# Patient Record
Sex: FEMALE | Race: BLACK OR AFRICAN AMERICAN | Employment: OTHER | URBAN - METROPOLITAN AREA
[De-identification: names, ages, dates, MRNs, and addresses within clinical notes are randomized per-mention and may not be internally consistent; named-entity substitution may affect disease eponyms.]

---

## 2018-11-16 ENCOUNTER — OFFICE VISIT (OUTPATIENT)
Dept: CARDIOLOGY CLINIC | Age: 73
End: 2018-11-16

## 2018-11-16 VITALS
TEMPERATURE: 98.3 F | RESPIRATION RATE: 24 BRPM | SYSTOLIC BLOOD PRESSURE: 166 MMHG | HEART RATE: 89 BPM | HEIGHT: 61 IN | OXYGEN SATURATION: 98 % | BODY MASS INDEX: 38.23 KG/M2 | DIASTOLIC BLOOD PRESSURE: 78 MMHG | WEIGHT: 202.5 LBS

## 2018-11-16 DIAGNOSIS — I34.0 MITRAL VALVE INSUFFICIENCY, UNSPECIFIED ETIOLOGY: Primary | ICD-10-CM

## 2018-11-16 DIAGNOSIS — I25.118 CORONARY ARTERY DISEASE OF NATIVE HEART WITH STABLE ANGINA PECTORIS, UNSPECIFIED VESSEL OR LESION TYPE (HCC): ICD-10-CM

## 2018-11-16 DIAGNOSIS — I48.0 PAROXYSMAL ATRIAL FIBRILLATION (HCC): ICD-10-CM

## 2018-11-16 DIAGNOSIS — Z95.5 S/P DRUG ELUTING CORONARY STENT PLACEMENT: ICD-10-CM

## 2018-11-16 DIAGNOSIS — I10 ESSENTIAL HYPERTENSION: ICD-10-CM

## 2018-11-16 DIAGNOSIS — J45.909 UNCOMPLICATED ASTHMA, UNSPECIFIED ASTHMA SEVERITY, UNSPECIFIED WHETHER PERSISTENT: ICD-10-CM

## 2018-11-16 PROBLEM — I25.10 CORONARY ARTERY DISEASE INVOLVING NATIVE CORONARY ARTERY OF NATIVE HEART: Status: ACTIVE | Noted: 2018-11-16

## 2018-11-16 PROBLEM — E03.9 ACQUIRED HYPOTHYROIDISM: Status: ACTIVE | Noted: 2018-11-16

## 2018-11-16 PROBLEM — E66.01 SEVERE OBESITY (HCC): Status: ACTIVE | Noted: 2018-11-16

## 2018-11-16 RX ORDER — METOPROLOL SUCCINATE 25 MG/1
25 TABLET, EXTENDED RELEASE ORAL DAILY
COMMUNITY

## 2018-11-16 RX ORDER — LEVETIRACETAM 500 MG/1
500 TABLET ORAL 2 TIMES DAILY
COMMUNITY

## 2018-11-16 RX ORDER — CETIRIZINE HCL 10 MG
10 TABLET ORAL DAILY
COMMUNITY
End: 2020-01-14

## 2018-11-16 RX ORDER — ATORVASTATIN CALCIUM 40 MG/1
40 TABLET, FILM COATED ORAL DAILY
COMMUNITY

## 2018-11-16 RX ORDER — VITAMIN E 268 MG
400 CAPSULE ORAL DAILY
COMMUNITY

## 2018-11-16 RX ORDER — AMIODARONE HYDROCHLORIDE 200 MG/1
200 TABLET ORAL DAILY
COMMUNITY
End: 2020-01-14

## 2018-11-16 RX ORDER — DIPHENHYDRAMINE HCL 25 MG
25 TABLET ORAL
COMMUNITY

## 2018-11-16 RX ORDER — MELATONIN
1000 DAILY
COMMUNITY

## 2018-11-16 RX ORDER — CLOPIDOGREL BISULFATE 75 MG/1
75 TABLET ORAL DAILY
COMMUNITY
End: 2020-01-14

## 2018-11-16 RX ORDER — CODEINE PHOSPHATE AND GUAIFENESIN 10; 100 MG/5ML; MG/5ML
5 SOLUTION ORAL
COMMUNITY

## 2018-11-16 RX ORDER — ALBUTEROL SULFATE 2.5 MG/.5ML
SOLUTION RESPIRATORY (INHALATION)
COMMUNITY

## 2018-11-16 RX ORDER — OXYBUTYNIN CHLORIDE 5 MG/1
5 TABLET ORAL 2 TIMES DAILY
COMMUNITY

## 2018-11-16 RX ORDER — ALBUTEROL SULFATE 90 UG/1
2 AEROSOL, METERED RESPIRATORY (INHALATION)
COMMUNITY

## 2018-11-16 NOTE — PROGRESS NOTES
I had the pleasure of seeing Ms. Sami Stover in the valve clinic earlier today with Ms. González Jackman NP - please see her note for details. She has 3+ MR due to annular dilation from AF, and fairly symptomatic with SANDERS. She is higher risk for open MVr due to poor rehab potential (obese and poor motivation), so will have her see surgeons for risk stratification.

## 2018-11-16 NOTE — PROGRESS NOTES
Patient: Clifton Ponce   Age: 74 y.o. Patient Care Team:  Paul Rodríguez MD as PCP - General (Internal Medicine)  Destiny Mesa MD (Cardiology)  Tanya Paul MD (Cardiothoracic Surgery)    PCP: Paul Rodríguez MD    Cardiologist: Helena Dhaliwal    Diagnosis/Reason for Consultation: The primary encounter diagnosis was Mitral valve insufficiency, unspecified etiology. Diagnoses of Paroxysmal atrial fibrillation (HCC), Essential hypertension, Coronary artery disease of native heart with stable angina pectoris, unspecified vessel or lesion type ([facility] [address] S/P drug eluting coronary stent placement, and Uncomplicated asthma, unspecified asthma severity, unspecified whether persistent were also pertinent to this visit. Problem List:   Patient Active Problem List   Diagnosis Code    Non-rheumatic mitral regurgitation I34.0    Acquired hypothyroidism E03.9    Paroxysmal atrial fibrillation (HCC) I48.0    Essential hypertension I10    Coronary artery disease involving native coronary artery of native heart I25.10    Severe obesity ([facility] [address] E66.01      HPI: 74 y.o.  female with PMHx of MR, Afib (3/2016), CAD s/p STEMI & MARTHA to Diag (9/2018), HTN, Asthma, Hypothyroid, Obesity, OA, Urinary incontinence, recurrent UTIs, Depression, PVD w/ chronic venous stasis ulcers, DVT, colon polyps, s/p terence, s/p SON/BSO, s/p  L TKR (2006), s/p R cataract removal (2007), past smoker (quit 1985) that is referred to the [address] [place] [address] by Dr. Helena Dhaliwal  for interventional evaluation of MR. Ms. Ann Knight has been followed by Dr. Kermit Spatz for a bit over 3 yrs when her PCP initially referred her for Afib. She was recently hospitalized for Afib w/ RVR and CHF. During that hospitalization there is a report of rhythm deterioration to VT and Vfib requiring defibrillation. She had subsequent PAF while hospitalized but [facility] chemical cardioversion w/ amiodarone.      Ms. Ann Knight has been home for approximately 4 weeks and reports having good and bad days. Her mobility is limited by considerable back pain as well as SOB/SANDERS. She does not get symptomatic w/ ADLs but has many adaptive devices (shower chair) and habits (sits often between/for tasks). She reports considerable fatigue but also states she is a 'poor sleeper.'  She has some chest tightness on occasion w/ activity that requires her to lie down for resolution. She also has two pillow orthopnea, LE edema and occasional fluid blisters (though none currently) and wears compression socks. She also c/o some lightheadedness when walking any extended distance in her home and doesn't use her rollator when inside. She denies any syncope or falls. Ms. Kalyn Cheney has an inhaler that she uses for 'emergencies' and has used it once since discharge. She lives alone and drove herself to the office today. She sets up her pill box on her own and is independent in all of her medical decision making and ADLs. She has a daughter that lives a couple blocks away that visits nightly. She remarks that she has 'trouble drinking all of the water she is supposed to.' She reports being instructed to drink at least 64 oz of water/day d/t a concern about her kidneys. This is unclear in her records and she denies any other providers / specialists. She denies any blood/blackness/tarriness in her stools. She denies any hx of chest surgery/trauma/XRT. NYHA Classification: II   Class II (Mild): Slight limitation of physical activity. Comfortable at rest, but ordinary physical activity results in fatigue, palpitation, or dyspnea.     Angina Classification: 3   Class 3: Angina with mild exertion   Walking 1-2 level blocks at normal pace; Climbing 1 flight of stairs at normal pace    Past Medical History:   Diagnosis Date    Arthritis     Asthma     Endocrine disease     thyroid    Hypertension     Psychiatric disorder     depression     Endocarditis: no  Pulmonary HTN: yes  Greater than 2/3 systemic: no  Immunocompromised/Steroids: no  Heart Failure Admission w/in past year:  yes  Heart Failure Admission w/in past 2 weeks: no  Liver Dz/Cirrhosis: no   If yes, MELD score: N/A  Last Dental Visit:  15+ yrs ago   Any dental pain/concerns: no - full dentures    Past Surgical History:   Procedure Laterality Date    HX CHOLECYSTECTOMY      HX ORTHOPAEDIC      knee      Social History     Tobacco Use    Smoking status: Never Smoker   Substance Use Topics    Alcohol use: Yes      No family history on file. Prior to Admission medications    Medication Sig Start Date End Date Taking? Authorizing Provider   albuterol (PROVENTIL HFA, VENTOLIN HFA, PROAIR HFA) 90 mcg/actuation inhaler Take 2 Puffs by inhalation every four (4) hours as needed for Wheezing. Yes Provider, Historical   albuterol sulfate (PROVENTIL;VENTOLIN) 2.5 mg/0.5 mL nebu nebulizer solution by Nebulization route every four (4) hours as needed for Wheezing. Yes Provider, Historical   amiodarone (CORDARONE) 200 mg tablet Take 200 mg by mouth daily. Yes Provider, Historical   apixaban (ELIQUIS) 5 mg tablet Take 5 mg by mouth two (2) times a day. Yes Provider, Historical   atorvastatin (LIPITOR) 40 mg tablet Take 40 mg by mouth daily. Yes Provider, Historical   cetirizine (ZYRTEC) 10 mg tablet Take 10 mg by mouth daily. Yes Provider, Historical   cholecalciferol (VITAMIN D3) 1,000 unit tablet Take 1,000 Units by mouth daily. Yes Provider, Historical   clopidogrel (PLAVIX) 75 mg tab Take 75 mg by mouth daily. Yes Provider, Historical   guaiFENesin-codeine (ROBITUSSIN AC) 100-10 mg/5 mL solution Take 5 mL by mouth three (3) times daily as needed for Cough. Yes Provider, Historical   cranberry 405 mg cap Take  by mouth daily. Yes Provider, Historical   diphenhydrAMINE (BENADRYL) 25 mg tablet Take 25 mg by mouth every six (6) hours as needed for Sleep.    Yes Provider, Historical levETIRAcetam (KEPPRA) 500 mg tablet Take 500 mg by mouth two (2) times a day. Yes Provider, Historical   metoprolol succinate (TOPROL-XL) 25 mg XL tablet Take 25 mg by mouth daily. Yes Provider, Historical   oxybutynin (DITROPAN) 5 mg tablet Take 5 mg by mouth two (2) times a day. Yes Provider, Historical   vitamin E (AQUA GEMS) 400 unit capsule Take 400 Units by mouth daily. Yes Provider, Historical   levothyroxine (SYNTHROID) 175 mcg tablet Take 137 mcg by mouth Daily (before breakfast). Yes Other, MD Jeremie   multivitamin (ONE A DAY) tablet Take 1 Tab by mouth daily. Yes Other, MD Jeremie   budesonide-formoterol (SYMBICORT) 160-4.5 mcg/actuation HFA inhaler Take 2 Puffs by inhalation two (2) times a day. Yes Other, MD Jeremie       Allergies   Allergen Reactions    Latex Hives    Barium Sulfate Hives    Sulfa Dyne Swelling     Mouth and tongue       Current Medications:   Current Outpatient Medications   Medication Sig Dispense Refill    albuterol (PROVENTIL HFA, VENTOLIN HFA, PROAIR HFA) 90 mcg/actuation inhaler Take 2 Puffs by inhalation every four (4) hours as needed for Wheezing.  albuterol sulfate (PROVENTIL;VENTOLIN) 2.5 mg/0.5 mL nebu nebulizer solution by Nebulization route every four (4) hours as needed for Wheezing.  amiodarone (CORDARONE) 200 mg tablet Take 200 mg by mouth daily.  apixaban (ELIQUIS) 5 mg tablet Take 5 mg by mouth two (2) times a day.  atorvastatin (LIPITOR) 40 mg tablet Take 40 mg by mouth daily.  cetirizine (ZYRTEC) 10 mg tablet Take 10 mg by mouth daily.  cholecalciferol (VITAMIN D3) 1,000 unit tablet Take 1,000 Units by mouth daily.  clopidogrel (PLAVIX) 75 mg tab Take 75 mg by mouth daily.  guaiFENesin-codeine (ROBITUSSIN AC) 100-10 mg/5 mL solution Take 5 mL by mouth three (3) times daily as needed for Cough.  cranberry 405 mg cap Take  by mouth daily.       diphenhydrAMINE (BENADRYL) 25 mg tablet Take 25 mg by mouth every six (6) hours as needed for Sleep.  levETIRAcetam (KEPPRA) 500 mg tablet Take 500 mg by mouth two (2) times a day.  metoprolol succinate (TOPROL-XL) 25 mg XL tablet Take 25 mg by mouth daily.  oxybutynin (DITROPAN) 5 mg tablet Take 5 mg by mouth two (2) times a day.  vitamin E (AQUA GEMS) 400 unit capsule Take 400 Units by mouth daily.  levothyroxine (SYNTHROID) 175 mcg tablet Take 137 mcg by mouth Daily (before breakfast).  multivitamin (ONE A DAY) tablet Take 1 Tab by mouth daily.  budesonide-formoterol (SYMBICORT) 160-4.5 mcg/actuation HFA inhaler Take 2 Puffs by inhalation two (2) times a day. Vitals: Blood pressure 166/78, pulse 89, temperature 98.3 °F (36.8 °C), temperature source Oral, resp. rate 24, height 5' 1\" (1.549 m), weight 202 lb 8 oz (91.9 kg), SpO2 98 %. Pre 6 min walk VS: HR 52, RR 16, 148/78, 98% sat on RA    Allergies: is allergic to latex; barium sulfate; and sulfa dyne.     Review of Systems: Pertinent Positives per HPI   [x]Total of 13 systems reviewed as follows:  Constitutional: Negative fever, negative chills  Eyes:   Negative for amauroses fugax  ENT:   Negative sore throat,oral absecess  Endocrine Negative for thyroid replacement Rx; goiter; DM  Respiratory:  Negative chronic cough,sputum production  Cards:   Negative for palpitations, lower extremity edema, varicosities, claudication  GI:   Negative for dysphagia, bleeding, nausea, vomiting, diarrhea, and abdominal pain  Genitourinary: Negative for frequency, dysuria  Integument:  Negative for rash and pruritus  Hematologic:  Negative for easy bruising; bleeding dyscarsia  Musculoskel: Negative for muscle weakness inhibiting ambulation  Neurological:  Negative for stroke, TIA, syncope, dizziness  Behavl/Psych: Negative for feelings of anxiety, depression     Cardiovascular Testing:   TTE 9/28/2018:  50-55% LVEF, LA markedly dilated, RV wnl, RA mildly enlarged, AV wnl, mod-severe MR with mild MAC and mild thickening of the anterior and posterior leaflets, mild TR w/ estimated PAS 45 mmHg    VIGNESH 10/4/2018: LVEF wnl but question of some basal inferior wall HK, mildly thickened MV leaflets, no evidence of prolapse or flail, posteior leaflet did appear to be somewhat restricted and diminutive, mod-severe central regurgitation, ERO 0.3 = 0.4cm2 PICA w/ regurgitant volume 63-90 ml; LA severely dilated    Cardiac catheterization 9/6/2018: MARTHA to proximal diag; attempted PCI of  of RCA. L main large caliber free of significant obstructive dz; LAD w/ proximal 30% lesion, mid vessel quite tortuous w/in apical segment, 90% proximal lesion, L circ free of dz, RCA w/ high grade ostial lesion. LVEDP 15-17 mmHg     Physical Exam:  General: Well nourished well groomed elderly woman appearing stated age accompanied by her daughter  Neuro: A&OX3. GRAYSON. PERRL. Steady assisted gait with rollator  Head:Normocephalic. Atraumatic. Symmetrical  Neck: Trachea Midline  Resp: CTA B. No Adv BS/cough/sputum/tachypnea with seated conversation  CV: S1S2 RRR. MONTSE III/VI. No JVD/carotid bruits. Pink/warm/dry extremities. Mod LE peripheral edema  GI:Benign ab. Soft. NT/ND. Active BS  : Voids  Integ: No obvious s/s of infection or breakdown  Musculo/Skeletal: FROM in all major joints. Modest muscle tone    Clinic Evaluation:   KCCQ-12: scanned into EMR    6 minute walk test: PreTest HR/02 sat:  See VS this visit - completed 5 min 2 seconds.  Stopped d/t fatigue             Post Test HR/02 sat: 89 / 98% on RA             Distance Walked: 613 ft 9 inchest    Frality Survey:  Marilin Alma Rosa Index ADL -6/6  scanned into EMR     STS 2.81 Risk Score / Predicted 30 day mortality: - calculations scanned into EMR   STS Adult Cardiac Surgery Database Version 2.9     RISK SCORES Procedure: MV Repair    Risk of Mortality: 1.345%     Renal Failure: 1.971%     Permanent Stroke: 1.183%     Prolonged Ventilation: 7.243%     DSW Infection: 0.112% Reoperation: 2.725%     Morbidity or Mortality: 10.933%     Short Length of Stay: 22.304%     Long Length of Stay: 4.958%    Procedure: Isolated MVR    Risk of Mortality: 4.235%     Renal Failure: 3.181%     Permanent Stroke: 1.474%     Prolonged Ventilation: 12.972%     DSW Infection: 0.215%     Reoperation: 5.446%     Morbidity or Mortality: 17.697%     Short Length of Stay: 15.558%     Long Length of Stay: 9.326%    Assessment/Plan:   1. MR - severe and symptomatic. High risk surgical candidate d/t frailty and mobility. Risks/benefits of TMVr discussed w/ pt and her daughter and they wish to proceed. 2. Afib (3/2016) - amio/BB/apixaban per cards  3. CAD s/p STEMI & MARTHA to Diag (9/2018) - clopidogrel/statin/BB  4. HTN - BB per cards  5. Asthma - Symbicort/Albuterol MDI and albuterol nebs per PCP  6. Hypothyroid - synthroid per PCP  7. Obesity- dietary counseling given. 8. OA - ? Keppra for neuropathic pain? Uses rollator  9. Urinary incontinence, recurrent UTIs - oxybutynin per PCP  10. Depression - no current Rx  11. Further plan/care by Ayse Archibald

## 2018-11-16 NOTE — PROGRESS NOTES
Pt seen and examined  Full note by Melinda Thomas NP reviewed   Echo reviewed  Discussed with DR Agnes Hoyos in valve clinic  She is moderately high surgical risk and has severe MR  Anular dilatation pronounced   Recommended mitral clip as initial attempt at treatment

## 2018-11-19 DIAGNOSIS — I34.0 NON-RHEUMATIC MITRAL REGURGITATION: Primary | ICD-10-CM

## 2018-11-21 ENCOUNTER — HOSPITAL ENCOUNTER (OUTPATIENT)
Dept: GENERAL RADIOLOGY | Age: 73
Discharge: HOME | End: 2018-11-21
Attending: NURSE PRACTITIONER
Payer: MEDICARE

## 2018-11-21 ENCOUNTER — HOSPITAL ENCOUNTER (OUTPATIENT)
Dept: PULMONOLOGY | Age: 73
Discharge: HOME | End: 2018-11-21
Attending: NURSE PRACTITIONER
Payer: MEDICARE

## 2018-11-21 ENCOUNTER — HOSPITAL ENCOUNTER (OUTPATIENT)
Dept: PREADMISSION TESTING | Age: 73
Discharge: HOME | End: 2018-11-21
Attending: NURSE PRACTITIONER
Payer: MEDICARE

## 2018-11-21 VITALS
SYSTOLIC BLOOD PRESSURE: 159 MMHG | WEIGHT: 200.8 LBS | BODY MASS INDEX: 37.91 KG/M2 | HEIGHT: 61 IN | DIASTOLIC BLOOD PRESSURE: 72 MMHG

## 2018-11-21 DIAGNOSIS — I34.0 NON-RHEUMATIC MITRAL REGURGITATION: ICD-10-CM

## 2018-11-21 LAB
ALBUMIN SERPL-MCNC: 3.6 G/DL (ref 3.5–5)
ALBUMIN/GLOB SERPL: 1.1 {RATIO} (ref 1.1–2.2)
ALP SERPL-CCNC: 38 U/L (ref 45–117)
ALT SERPL-CCNC: 13 U/L (ref 12–78)
ANION GAP SERPL CALC-SCNC: 9 MMOL/L (ref 5–15)
APPEARANCE UR: CLEAR
ARTERIAL PATENCY WRIST A: YES
AST SERPL-CCNC: 9 U/L (ref 15–37)
ATRIAL RATE: 61 BPM
BACTERIA URNS QL MICRO: NEGATIVE /HPF
BASE DEFICIT BLD-SCNC: 2 MMOL/L
BASOPHILS # BLD: 0 K/UL (ref 0–0.1)
BASOPHILS NFR BLD: 1 % (ref 0–1)
BDY SITE: NORMAL
BILIRUB SERPL-MCNC: 0.5 MG/DL (ref 0.2–1)
BILIRUB UR QL: NEGATIVE
BNP SERPL-MCNC: 3642 PG/ML (ref 0–450)
BUN SERPL-MCNC: 14 MG/DL (ref 6–20)
BUN/CREAT SERPL: 10 (ref 12–20)
CALCIUM SERPL-MCNC: 8.5 MG/DL (ref 8.5–10.1)
CALCULATED P AXIS, ECG09: 86 DEGREES
CALCULATED R AXIS, ECG10: 47 DEGREES
CALCULATED T AXIS, ECG11: 19 DEGREES
CHLORIDE SERPL-SCNC: 109 MMOL/L (ref 97–108)
CO2 SERPL-SCNC: 22 MMOL/L (ref 21–32)
COLOR UR: ABNORMAL
CREAT SERPL-MCNC: 1.37 MG/DL (ref 0.55–1.02)
DIAGNOSIS, 93000: NORMAL
DIFFERENTIAL METHOD BLD: ABNORMAL
EOSINOPHIL # BLD: 0.1 K/UL (ref 0–0.4)
EOSINOPHIL NFR BLD: 2 % (ref 0–7)
EPITH CASTS URNS QL MICRO: ABNORMAL /LPF
ERYTHROCYTE [DISTWIDTH] IN BLOOD BY AUTOMATED COUNT: 14 % (ref 11.5–14.5)
EST. AVERAGE GLUCOSE BLD GHB EST-MCNC: NORMAL MG/DL
GAS FLOW.O2 O2 DELIVERY SYS: NORMAL L/MIN
GLOBULIN SER CALC-MCNC: 3.2 G/DL (ref 2–4)
GLUCOSE SERPL-MCNC: 96 MG/DL (ref 65–100)
GLUCOSE UR STRIP.AUTO-MCNC: NEGATIVE MG/DL
HBA1C MFR BLD: 4.9 % (ref 4.2–6.3)
HCO3 BLD-SCNC: 22.3 MMOL/L (ref 22–26)
HCT VFR BLD AUTO: 31.2 % (ref 35–47)
HGB BLD-MCNC: 10 G/DL (ref 11.5–16)
HGB UR QL STRIP: NEGATIVE
HYALINE CASTS URNS QL MICRO: ABNORMAL /LPF (ref 0–5)
IMM GRANULOCYTES # BLD: 0 K/UL (ref 0–0.04)
IMM GRANULOCYTES NFR BLD AUTO: 1 % (ref 0–0.5)
KETONES UR QL STRIP.AUTO: NEGATIVE MG/DL
LEUKOCYTE ESTERASE UR QL STRIP.AUTO: ABNORMAL
LYMPHOCYTES # BLD: 0.8 K/UL (ref 0.8–3.5)
LYMPHOCYTES NFR BLD: 25 % (ref 12–49)
MAGNESIUM SERPL-MCNC: 1.7 MG/DL (ref 1.6–2.4)
MCH RBC QN AUTO: 30.4 PG (ref 26–34)
MCHC RBC AUTO-ENTMCNC: 32.1 G/DL (ref 30–36.5)
MCV RBC AUTO: 94.8 FL (ref 80–99)
MONOCYTES # BLD: 0.3 K/UL (ref 0–1)
MONOCYTES NFR BLD: 10 % (ref 5–13)
NEUTS SEG # BLD: 1.9 K/UL (ref 1.8–8)
NEUTS SEG NFR BLD: 61 % (ref 32–75)
NITRITE UR QL STRIP.AUTO: NEGATIVE
NRBC # BLD: 0 K/UL (ref 0–0.01)
NRBC BLD-RTO: 0 PER 100 WBC
O2/TOTAL GAS SETTING VFR VENT: 21 %
P-R INTERVAL, ECG05: 146 MS
PCO2 BLD: 35 MMHG (ref 35–45)
PH BLD: 7.41 [PH] (ref 7.35–7.45)
PH UR STRIP: 5.5 [PH] (ref 5–8)
PLATELET # BLD AUTO: 173 K/UL (ref 150–400)
PMV BLD AUTO: 10.1 FL (ref 8.9–12.9)
PO2 BLD: 88 MMHG (ref 80–100)
POTASSIUM SERPL-SCNC: 4 MMOL/L (ref 3.5–5.1)
PROT SERPL-MCNC: 6.8 G/DL (ref 6.4–8.2)
PROT UR STRIP-MCNC: NEGATIVE MG/DL
Q-T INTERVAL, ECG07: 464 MS
QRS DURATION, ECG06: 94 MS
QTC CALCULATION (BEZET), ECG08: 467 MS
RBC # BLD AUTO: 3.29 M/UL (ref 3.8–5.2)
RBC #/AREA URNS HPF: ABNORMAL /HPF (ref 0–5)
RBC MORPH BLD: ABNORMAL
RBC MORPH BLD: ABNORMAL
SAO2 % BLD: 97 % (ref 92–97)
SODIUM SERPL-SCNC: 140 MMOL/L (ref 136–145)
SP GR UR REFRACTOMETRY: 1.01 (ref 1–1.03)
SPECIMEN TYPE: NORMAL
TSH SERPL DL<=0.05 MIU/L-ACNC: 0.06 UIU/ML (ref 0.36–3.74)
UA: UC IF INDICATED,UAUC: ABNORMAL
UROBILINOGEN UR QL STRIP.AUTO: 0.2 EU/DL (ref 0.2–1)
VENTRICULAR RATE, ECG03: 61 BPM
WBC # BLD AUTO: 3.1 K/UL (ref 3.6–11)
WBC URNS QL MICRO: ABNORMAL /HPF (ref 0–4)

## 2018-11-21 PROCEDURE — 80053 COMPREHEN METABOLIC PANEL: CPT

## 2018-11-21 PROCEDURE — 93880 EXTRACRANIAL BILAT STUDY: CPT

## 2018-11-21 PROCEDURE — 85025 COMPLETE CBC W/AUTO DIFF WBC: CPT

## 2018-11-21 PROCEDURE — 93005 ELECTROCARDIOGRAM TRACING: CPT

## 2018-11-21 PROCEDURE — 36415 COLL VENOUS BLD VENIPUNCTURE: CPT

## 2018-11-21 PROCEDURE — 71046 X-RAY EXAM CHEST 2 VIEWS: CPT

## 2018-11-21 PROCEDURE — 83735 ASSAY OF MAGNESIUM: CPT

## 2018-11-21 PROCEDURE — 82803 BLOOD GASES ANY COMBINATION: CPT

## 2018-11-21 PROCEDURE — 83880 ASSAY OF NATRIURETIC PEPTIDE: CPT

## 2018-11-21 PROCEDURE — 94010 BREATHING CAPACITY TEST: CPT

## 2018-11-21 PROCEDURE — 36600 WITHDRAWAL OF ARTERIAL BLOOD: CPT

## 2018-11-21 PROCEDURE — 86923 COMPATIBILITY TEST ELECTRIC: CPT

## 2018-11-21 PROCEDURE — 81001 URINALYSIS AUTO W/SCOPE: CPT

## 2018-11-21 PROCEDURE — 84443 ASSAY THYROID STIM HORMONE: CPT

## 2018-11-21 PROCEDURE — 83036 HEMOGLOBIN GLYCOSYLATED A1C: CPT

## 2018-11-21 PROCEDURE — 86901 BLOOD TYPING SEROLOGIC RH(D): CPT

## 2018-11-21 NOTE — PROCEDURES
[facility]  *** FINAL REPORT ***    Name: Anthony Castro  MRN: [id number]    Outpatient  : 09 Mar 1945  HIS Order #: [id number]  [id number] [facility] Visit #: [id number]  Date: 2018    TYPE OF TEST: Cerebrovascular Duplex    REASON FOR TEST  pre-op for cardiac surgery    Right Carotid:-             Proximal               Mid                 Distal  cm/s  Systolic  Diastolic  Systolic  Diastolic  Systolic  Diastolic  CCA:     62.8      15.0                            52.0      10.0  Bulb:  ECA:     65.0  ICA:     46.0       9.0       62.0      14.0       62.0      16.0  ICA/CCA:  0.9       0.9    ICA Stenosis:    Right Vertebral:-  Finding: Antegrade  Sys:       46.0  Mya:    Right Subclavian:    Left Carotid:-            Proximal                Mid                 Distal  cm/s  Systolic  Diastolic  Systolic  Diastolic  Systolic  Diastolic  CCA:     82.6      15.0                            61.0      15.0  Bulb:  ECA:     63.0  ICA:     57.0      17.0       62.0      21.0       64.0      10.0  ICA/CCA:  0.9       1.1    ICA Stenosis:    Left Vertebral:-  Finding: Antegrade  Sys:       43.0  Mya:    Left Subclavian:    INTERPRETATION/FINDINGS  PROCEDURE:  Color duplex ultrasound imaging of extracranial  cerebrovascular arteries. FINDINGS:       Right:  Internal carotid velocity is within normal limits. There   is narrowing of the internal carotid flow channel on color Doppler  imaging and mixed density plaque on B-mode imaging, consistent with  less than 50 percent stenosis. The common and external carotid  arteries are patent and without evidence of hemodynamically  significant stenosis. Left:   Internal carotid velocity is within normal limits. There   is narrowing of the internal carotid flow channel on color Doppler  imaging and mixed density plaque on B-mode imaging, consistent with  less than 50 percent stenosis.   The common and external carotid  arteries are patent and without evidence of hemodynamically  significant stenosis. IMPRESSION:  Consistent with less than 50% stenosis of the internal  carotids. Vertebrals are patent with antegrade flow. ADDITIONAL COMMENTS    I have personally reviewed the data relevant to the interpretation of  this  study.     TECHNOLOGIST: Paola Greene RVT  Signed: 11/21/2018 11:31 AM    PHYSICIAN: Prince Lorenzo MD  Signed: 11/26/2018 06:44 AM

## 2018-11-21 NOTE — H&P
CSS 
 History and Physical 
 
Subjective:  
  
**information obtained from previous office note -no changes 74 y.o.  female with PMHx of MR, Afib (3/2016), CAD s/p STEMI & MARTHA to Diag (9/2018), HTN, Asthma, Hypothyroid, Obesity, OA, Urinary incontinence, recurrent UTIs, Depression, PVD w/ chronic venous stasis ulcers, DVT, colon polyps, s/p terence, s/p SON/BSO, s/p  L TKR (2006), s/p R cataract removal (2007), past smoker (quit 1985) that is referred to the [address] [place] [address] by Dr. Anthony Jewell  for interventional evaluation of MR. 
  
Ms. Kavitha Mcdowell has been followed by Dr. Yocasta Santiago for a bit over 3 yrs when her PCP initially referred her for Afib. She was recently hospitalized for Afib w/ RVR and CHF. During that hospitalization there is a report of rhythm deterioration to VT and Vfib requiring defibrillation. She had subsequent PAF while hospitalized but was ultimately discharged in [facility] after chemical cardioversion w/ amiodarone.  
  
Ms. Kavitha Mcdowell has been home for approximately 4 weeks and reports having good and bad days. Her mobility is limited by considerable back pain as well as SOB/SANDERS. She does not get symptomatic w/ ADLs but has many adaptive devices (shower chair) and habits (sits often between/for tasks). She reports considerable fatigue but also states she is a 'poor sleeper.'  She has some chest tightness on occasion w/ activity that requires her to lie down for resolution. She also has two pillow orthopnea, LE edema and occasional fluid blisters (though none currently) and wears compression socks. She also c/o some lightheadedness when walking any extended distance in her home and doesn't use her rollator when inside. She denies any syncope or falls. Ms. Kavitha Mcdowell has an inhaler that she uses for 'emergencies' and has used it once since discharge.  
  
She lives alone and drove herself to the office today.   She sets up her pill box on her own and is independent in all of her medical decision making and ADLs. She has a daughter that lives a couple blocks away that visits nightly. She remarks that she has 'trouble drinking all of the water she is supposed to.' She reports being instructed to drink at least 64 oz of water/day d/t a concern about her kidneys. This is unclear in her records and she denies any other providers / specialists. She denies any blood/blackness/tarriness in her stools. She denies any hx of chest surgery/trauma/XRT.   
  
NYHA Classification: II 
            Class II (Mild): Slight limitation of physical activity. Comfortable at rest, but ordinary physical activity results in fatigue, palpitation, or dyspnea. 
  
Angina Classification: 3 Class 3: Angina with mild exertion Walking 1-2 level blocks at normal pace; Climbing 1 flight of stairs at normal pace Cardiac Testing TTE 9/28/2018:  50-55% LVEF, LA markedly dilated, RV wnl, RA mildly enlarged, AV wnl, mod-severe MR with mild MAC and mild thickening of the anterior and posterior leaflets, mild TR w/ estimated PAS 45 mmHg 
  
VIGNESH 10/4/2018: LVEF wnl but question of some basal inferior wall HK, mildly thickened MV leaflets, no evidence of prolapse or flail, posteior leaflet did appear to be somewhat restricted and diminutive, mod-severe central regurgitation, ERO 0.3 = 0.4cm2 PICA w/ regurgitant volume 63-90 ml; LA severely dilated 
  
Cardiac catheterization 9/6/2018: MARTHA to proximal diag; attempted PCI of  of RCA. L main large caliber free of significant obstructive dz; LAD w/ proximal 30% lesion, mid vessel quite tortuous w/in apical segment, 90% proximal lesion, L circ free of dz, RCA w/ high grade ostial lesion. LVEDP 15-17 mmHg Past Medical History:  
Diagnosis Date  Arthritis  Asthma  Endocrine disease   
 thyroid  Hypertension  Psychiatric disorder   
 depression Past Surgical History: Procedure Laterality Date  HX CHOLECYSTECTOMY  HX ORTHOPAEDIC    
 knee Social History Tobacco Use  Smoking status: Never Smoker Substance Use Topics  Alcohol use: Yes No family history on file. Prior to Admission medications Medication Sig Start Date End Date Taking? Authorizing Provider  
albuterol (PROVENTIL HFA, VENTOLIN HFA, PROAIR HFA) 90 mcg/actuation inhaler Take 2 Puffs by inhalation every four (4) hours as needed for Wheezing. Provider, Historical  
albuterol sulfate (PROVENTIL;VENTOLIN) 2.5 mg/0.5 mL nebu nebulizer solution by Nebulization route every four (4) hours as needed for Wheezing. Provider, Historical  
amiodarone (CORDARONE) 200 mg tablet Take 200 mg by mouth daily. Provider, Historical  
apixaban (ELIQUIS) 5 mg tablet Take 5 mg by mouth two (2) times a day. Provider, Historical  
atorvastatin (LIPITOR) 40 mg tablet Take 40 mg by mouth daily. Provider, Historical  
cetirizine (ZYRTEC) 10 mg tablet Take 10 mg by mouth daily. Provider, Historical  
cholecalciferol (VITAMIN D3) 1,000 unit tablet Take 1,000 Units by mouth daily. Provider, Historical  
clopidogrel (PLAVIX) 75 mg tab Take 75 mg by mouth daily. Provider, Historical  
guaiFENesin-codeine (ROBITUSSIN AC) 100-10 mg/5 mL solution Take 5 mL by mouth three (3) times daily as needed for Cough. Provider, Historical  
cranberry 405 mg cap Take  by mouth daily. Provider, Historical  
diphenhydrAMINE (BENADRYL) 25 mg tablet Take 25 mg by mouth every six (6) hours as needed for Sleep. Provider, Historical  
levETIRAcetam (KEPPRA) 500 mg tablet Take 500 mg by mouth two (2) times a day. Provider, Historical  
metoprolol succinate (TOPROL-XL) 25 mg XL tablet Take 25 mg by mouth daily. Provider, Historical  
oxybutynin (DITROPAN) 5 mg tablet Take 5 mg by mouth two (2) times a day. Provider, Historical  
vitamin E (AQUA GEMS) 400 unit capsule Take 400 Units by mouth daily. Provider, Son  
levothyroxine (SYNTHROID) 175 mcg tablet Take 137 mcg by mouth Daily (before breakfast). Jeremie Jha MD  
multivitamin (ONE A DAY) tablet Take 1 Tab by mouth daily. Jeremie Jha MD  
budesonide-formoterol (SYMBICORT) 160-4.5 mcg/actuation HFA inhaler Take 2 Puffs by inhalation two (2) times a day. Jeremie Jha MD  
   
Allergies Allergen Reactions  Latex Hives  Barium Sulfate Hives  Sulfa Dyne Swelling Mouth and tongue Review of Systems: pertinent positives in HPI Consititutional: Denies fever or chills. Eyes:  Denies use of glasses or vision problems(cataracts). ENT:  Denies hearing or swallowing difficulty. CV: Denies CP, claudication, HTN. Resp: Denies dyspnea, productive cough. : Denies dialysis or kidney problems. GI: Denies ulcers, esophageal strictures, liver problems. M/S: Denies joint or bone problems, or implanted artificial hardware. Skin: Denies varicose veins, edema. Neuro: Denies strokes, or TIAs. Psych: Denies anxiety or depression. Endocrine: Denies thyroid problems or diabetes. Heme/Lymphatic: Denies easy bruising or lymphedema. Objective:  
 
VS: T99.2 sats 98% HR 69 /68 Physical Exam:   
General appearance: alert, cooperative, no distress Head: normocephalic, without obvious abnormality; atraumatic Eyes: conjunctivae/corneas clear; EOM's intact. Nose: nares normal; no drainage. Neck: no carotid bruit and no JVD Lungs: clear to auscultation bilaterally Heart: regular rate and rhythm; +3/6 murmur Abdomen: soft, non-tender; bowel sounds normal 
Extremities: moves all extremities; no weakness. Skin: Skin color normal; No varicose veins, 2+ LE edema. Neurologic: Grossly normal   
 
Labs:  
Recent Labs  
  11/21/18 
1214 WBC 3.1* HGB 10.0* HCT 31.2*  
   
K 4.0  
BUN 14  
CREA 1.37* GLU 96 Diagnostics:  
PA and lateral: IMPRESSION: No acute process identified Carotid doppler: IMPRESSION:  Consistent with less than 50% stenosis of the internal 
carotids. Vertebrals are patent with antegrade flow. PFTS-FEV1: 1.27L <79% predicted EKG: Normal sinus rhythm Nonspecific ST abnormality Abnormal ECG No previous ECGs available Assessment:  
 
Active Problems: 
  Non-rheumatic mitral regurgitation (11/16/2018) Plan: The risk and benefit of surgery were reviewed with patient and family and all questions answered and the patient wishes to proceed. Risk include infection, bleeding, stroke, heart attack, irregular heart rhythm, kidney failure and death. The patient was given instructions. The patient was instructed to take last dose of eliquis 12/3/18. Surgery is scheduled for 12/5/18. STS Risk Calculator V2.81 - Discussed by surgeon with patient. STS Adult Cardiac Surgery Database Version 2.9 RISK SCORES Procedure: MV Repair Risk of Mortality: 1.345% Renal Failure: 1.971% Permanent Stroke: 1.183% Prolonged Ventilation: 7.243% DSW Infection: 0.112% Reoperation: 2.725% Morbidity or Mortality: 10.933% Short Length of Stay: 22.304% Long Length of Stay: 4.958% Procedure: Isolated MVR Risk of Mortality: 4.235% Renal Failure: 3.181% Permanent Stroke: 1.474% Prolonged Ventilation: 12.972% DSW Infection: 0.215% Reoperation: 5.446% Morbidity or Mortality: 17.697% Short Length of Stay: 15.558% Long Length of Stay: 9.326% 
  
Assessment/Plan: 1. MR - severe and symptomatic. High risk surgical candidate d/t frailty and mobility. Risks/benefits of TMVr discussed w/ pt and her daughter and they wish to proceed. 2. Afib (3/2016) - amio/BB/apixaban per cards. Hold eliquis 24 hrs preop. 3. CAD s/p STEMI & MARTHA to Diag (9/2018) - clopidogrel/statin/BB 4. HTN - BB per cards 5. Asthma - Symbicort/Albuterol MDI and albuterol nebs per PCP 6. Hypothyroid - synthroid per PCP 7. Obesity- dietary counseling given. 8. OA - ? Keppra for neuropathic pain? Uses rollator 9. Urinary incontinence, recurrent UTIs-  oxybutynin per PCP. UA shows trace leuks, follow culture. 10. Depression - no current Rx 11. Renal insufficiency: Cr elevated on preop labs, similar to labs drawn in 2012. Monitor Cr 
12. Further plan/care by Ayse Caballero Signed By: Jacqui Marte NP November 21, 2018

## 2018-11-22 NOTE — PROCEDURES
[address]  PULMONARY FUNCTION    Rasheeda Aleman  MR#: [id number]  : 1945  ACCOUNT #: [de-identified]   DATE OF SERVICE: 2018    REFERRING PHYSICIAN:  Cary Soulier     INDICATION:  Mitral regurg. Pulmonary function testing on 2018 reviewed. The vital capacity is 1.8 liters, 76% predicted. FEV1 is 1.27 liters, 79% predicted. FEV1/FVC ratio is normal at 70%. Flow volume loop does demonstrate attenuation of both inspiratory and expiratory limbs with patient demonstrating poor effort per technician. IMPRESSION:  Limited quality pulmonary function testing suggesting possible mild restrictive physiology without evidence of concomitant obstruction. Clinical correlation advised.       MD MAYRA Craft / ADRIANNE  D: 2018 11:33     T: 2018 12:14  JOB #: [id number]

## 2018-12-04 ENCOUNTER — TELEPHONE (OUTPATIENT)
Dept: CASE MANAGEMENT | Age: 73
End: 2018-12-04

## 2018-12-04 RX ORDER — SODIUM CHLORIDE 9 MG/ML
1.5-3 INJECTION, SOLUTION INTRAVENOUS
Status: DISPENSED | OUTPATIENT
Start: 2018-12-05 | End: 2018-12-05

## 2018-12-04 RX ORDER — SODIUM CHLORIDE 0.9 % (FLUSH) 0.9 %
5-10 SYRINGE (ML) INJECTION AS NEEDED
Status: CANCELLED | OUTPATIENT
Start: 2018-12-04

## 2018-12-04 RX ORDER — MORPHINE SULFATE 10 MG/ML
2 INJECTION, SOLUTION INTRAMUSCULAR; INTRAVENOUS
Status: CANCELLED | OUTPATIENT
Start: 2018-12-04

## 2018-12-04 RX ORDER — FENTANYL CITRATE 50 UG/ML
25 INJECTION, SOLUTION INTRAMUSCULAR; INTRAVENOUS
Status: CANCELLED | OUTPATIENT
Start: 2018-12-04

## 2018-12-04 RX ORDER — ONDANSETRON 2 MG/ML
4 INJECTION INTRAMUSCULAR; INTRAVENOUS AS NEEDED
Status: CANCELLED | OUTPATIENT
Start: 2018-12-04

## 2018-12-04 RX ORDER — SODIUM CHLORIDE, SODIUM LACTATE, POTASSIUM CHLORIDE, CALCIUM CHLORIDE 600; 310; 30; 20 MG/100ML; MG/100ML; MG/100ML; MG/100ML
100 INJECTION, SOLUTION INTRAVENOUS CONTINUOUS
Status: CANCELLED | OUTPATIENT
Start: 2018-12-04

## 2018-12-04 RX ORDER — OXYCODONE HYDROCHLORIDE 5 MG/1
5 TABLET ORAL AS NEEDED
Status: CANCELLED | OUTPATIENT
Start: 2018-12-04

## 2018-12-04 RX ORDER — MIDAZOLAM HYDROCHLORIDE 1 MG/ML
0.5 INJECTION, SOLUTION INTRAMUSCULAR; INTRAVENOUS
Status: CANCELLED | OUTPATIENT
Start: 2018-12-04

## 2018-12-04 NOTE — PERIOP NOTES
PAT PHONE INTERVIEW COMPLETED WITH PT.  PT WAS GIVEN INFECTION PREVENTION INFORMATION VERBALLY; PT VOICED UNDERSTANDING. PT WAS GIVEN THE OPPORTUNITY TO ASK ADDITIONAL QUESTIONS. NO PTA MEDICATION DIRECTIONS GIVEN TO PT BY PAT; HER MEDICATIONS WERE VERIFIED TO BE CORRECT IN CC.  PT WAS GIVEN PRE OP DIRECTIONS BY SURGEON'S OFFICE.

## 2018-12-04 NOTE — TELEPHONE ENCOUNTER
Cardiac Surgery Care Coordinator-  Called Yonny Velásquez to review plan of care and day of surgery expectations. Encouraged Yonny Velásquez to verbalize and offered emotional support. Yonny Velásquez is without questions or concerns at this time.   Carlitos Reed RN

## 2018-12-05 ENCOUNTER — ANESTHESIA EVENT (OUTPATIENT)
Dept: CARDIOTHORACIC SURGERY | Age: 73
DRG: 229 | End: 2018-12-05
Payer: MEDICARE

## 2018-12-05 ENCOUNTER — APPOINTMENT (OUTPATIENT)
Dept: GENERAL RADIOLOGY | Age: 73
DRG: 229 | End: 2018-12-05
Attending: THORACIC SURGERY (CARDIOTHORACIC VASCULAR SURGERY)
Payer: MEDICARE

## 2018-12-05 ENCOUNTER — HOSPITAL ENCOUNTER (INPATIENT)
Age: 73
LOS: 2 days | Discharge: HOME | DRG: 229 | End: 2018-12-07
Attending: THORACIC SURGERY (CARDIOTHORACIC VASCULAR SURGERY) | Admitting: THORACIC SURGERY (CARDIOTHORACIC VASCULAR SURGERY)
Payer: MEDICARE

## 2018-12-05 ENCOUNTER — ANESTHESIA (OUTPATIENT)
Dept: CARDIOTHORACIC SURGERY | Age: 73
DRG: 229 | End: 2018-12-05
Payer: MEDICARE

## 2018-12-05 ENCOUNTER — APPOINTMENT (OUTPATIENT)
Dept: GENERAL RADIOLOGY | Age: 73
DRG: 229 | End: 2018-12-05
Attending: PHYSICIAN ASSISTANT
Payer: MEDICARE

## 2018-12-05 PROBLEM — I34.0 MITRAL REGURGITATION: Status: ACTIVE | Noted: 2018-12-05

## 2018-12-05 LAB
ACT BLD: 241 SECS (ref 79–138)
ACT BLD: 252 SECS (ref 79–138)
ACT BLD: 274 SECS (ref 79–138)
ALBUMIN SERPL-MCNC: 3.1 G/DL (ref 3.5–5)
ALBUMIN/GLOB SERPL: 1.1 {RATIO} (ref 1.1–2.2)
ALP SERPL-CCNC: 37 U/L (ref 45–117)
ALT SERPL-CCNC: 10 U/L (ref 12–78)
ANION GAP SERPL CALC-SCNC: 8 MMOL/L (ref 5–15)
APTT PPP: 25.5 SEC (ref 22.1–32)
APTT PPP: 25.5 SEC (ref 22.1–32)
AST SERPL-CCNC: 10 U/L (ref 15–37)
BASOPHILS # BLD: 0 K/UL (ref 0–0.1)
BASOPHILS NFR BLD: 0 % (ref 0–1)
BILIRUB SERPL-MCNC: 0.4 MG/DL (ref 0.2–1)
BUN SERPL-MCNC: 16 MG/DL (ref 6–20)
BUN/CREAT SERPL: 14 (ref 12–20)
CALCIUM SERPL-MCNC: 7.7 MG/DL (ref 8.5–10.1)
CHLORIDE SERPL-SCNC: 113 MMOL/L (ref 97–108)
CO2 SERPL-SCNC: 23 MMOL/L (ref 21–32)
CREAT SERPL-MCNC: 1.17 MG/DL (ref 0.55–1.02)
DIFFERENTIAL METHOD BLD: ABNORMAL
EOSINOPHIL # BLD: 0.1 K/UL (ref 0–0.4)
EOSINOPHIL NFR BLD: 2 % (ref 0–7)
ERYTHROCYTE [DISTWIDTH] IN BLOOD BY AUTOMATED COUNT: 13.5 % (ref 11.5–14.5)
GLOBULIN SER CALC-MCNC: 2.7 G/DL (ref 2–4)
GLUCOSE SERPL-MCNC: 98 MG/DL (ref 65–100)
HCT VFR BLD AUTO: 28.9 % (ref 35–47)
HGB BLD-MCNC: 9.3 G/DL (ref 11.5–16)
IMM GRANULOCYTES # BLD: 0 K/UL
IMM GRANULOCYTES NFR BLD AUTO: 0 %
INR PPP: 1.1 (ref 0.9–1.1)
INR PPP: 1.7 (ref 0.9–1.1)
LYMPHOCYTES # BLD: 0.9 K/UL (ref 0.8–3.5)
LYMPHOCYTES NFR BLD: 20 % (ref 12–49)
MAGNESIUM SERPL-MCNC: 1.7 MG/DL (ref 1.6–2.4)
MCH RBC QN AUTO: 30.4 PG (ref 26–34)
MCHC RBC AUTO-ENTMCNC: 32.2 G/DL (ref 30–36.5)
MCV RBC AUTO: 94.4 FL (ref 80–99)
METAMYELOCYTES NFR BLD MANUAL: 1 %
MONOCYTES # BLD: 0.1 K/UL (ref 0–1)
MONOCYTES NFR BLD: 2 % (ref 5–13)
NEUTS BAND NFR BLD MANUAL: 5 % (ref 0–6)
NEUTS SEG # BLD: 3.4 K/UL (ref 1.8–8)
NEUTS SEG NFR BLD: 70 % (ref 32–75)
NRBC # BLD: 0 K/UL (ref 0–0.01)
NRBC BLD-RTO: 0 PER 100 WBC
PLATELET # BLD AUTO: 199 K/UL (ref 150–400)
PMV BLD AUTO: 9.6 FL (ref 8.9–12.9)
POTASSIUM SERPL-SCNC: 3.6 MMOL/L (ref 3.5–5.1)
PROT SERPL-MCNC: 5.8 G/DL (ref 6.4–8.2)
PROTHROMBIN TIME: 11.5 SEC (ref 9–11.1)
PROTHROMBIN TIME: 16.7 SEC (ref 9–11.1)
RBC # BLD AUTO: 3.06 M/UL (ref 3.8–5.2)
RBC MORPH BLD: ABNORMAL
RBC MORPH BLD: ABNORMAL
SODIUM SERPL-SCNC: 144 MMOL/L (ref 136–145)
THERAPEUTIC RANGE,PTTT: NORMAL SECS (ref 58–77)
THERAPEUTIC RANGE,PTTT: NORMAL SECS (ref 58–77)
WBC # BLD AUTO: 4.5 K/UL (ref 3.6–11)

## 2018-12-05 PROCEDURE — 74011000250 HC RX REV CODE- 250: Performed by: THORACIC SURGERY (CARDIOTHORACIC VASCULAR SURGERY)

## 2018-12-05 PROCEDURE — 77030002996 HC SUT SLK J&J -A: Performed by: THORACIC SURGERY (CARDIOTHORACIC VASCULAR SURGERY)

## 2018-12-05 PROCEDURE — 74011250637 HC RX REV CODE- 250/637: Performed by: PHYSICIAN ASSISTANT

## 2018-12-05 PROCEDURE — 77030013797 HC KT TRNSDUC PRSSR EDWD -A

## 2018-12-05 PROCEDURE — 76060000035 HC ANESTHESIA 2 TO 2.5 HR: Performed by: THORACIC SURGERY (CARDIOTHORACIC VASCULAR SURGERY)

## 2018-12-05 PROCEDURE — 77030039626: Performed by: THORACIC SURGERY (CARDIOTHORACIC VASCULAR SURGERY)

## 2018-12-05 PROCEDURE — 77030018729 HC ELECTRD DEFIB PAD CARD -B

## 2018-12-05 PROCEDURE — 03HY32Z INSERTION OF MONITORING DEVICE INTO UPPER ARTERY, PERCUTANEOUS APPROACH: ICD-10-PCS | Performed by: ANESTHESIOLOGY

## 2018-12-05 PROCEDURE — 77030013079 HC BLNKT BAIR HGGR 3M -A: Performed by: ANESTHESIOLOGY

## 2018-12-05 PROCEDURE — C1892 INTRO/SHEATH,FIXED,PEEL-AWAY: HCPCS | Performed by: THORACIC SURGERY (CARDIOTHORACIC VASCULAR SURGERY)

## 2018-12-05 PROCEDURE — B24BZZ4 ULTRASONOGRAPHY OF HEART WITH AORTA, TRANSESOPHAGEAL: ICD-10-PCS | Performed by: ANESTHESIOLOGY

## 2018-12-05 PROCEDURE — C1894 INTRO/SHEATH, NON-LASER: HCPCS | Performed by: THORACIC SURGERY (CARDIOTHORACIC VASCULAR SURGERY)

## 2018-12-05 PROCEDURE — 74011000250 HC RX REV CODE- 250

## 2018-12-05 PROCEDURE — 74011250636 HC RX REV CODE- 250/636: Performed by: PHYSICIAN ASSISTANT

## 2018-12-05 PROCEDURE — 77030013744

## 2018-12-05 PROCEDURE — 93325 DOPPLER ECHO COLOR FLOW MAPG: CPT

## 2018-12-05 PROCEDURE — 77030020506 HC NDL TRNSPTL NRG BAYL -F: Performed by: THORACIC SURGERY (CARDIOTHORACIC VASCULAR SURGERY)

## 2018-12-05 PROCEDURE — 85610 PROTHROMBIN TIME: CPT

## 2018-12-05 PROCEDURE — C1751 CATH, INF, PER/CENT/MIDLINE: HCPCS

## 2018-12-05 PROCEDURE — 77030019702 HC WRP THER MENM -C: Performed by: THORACIC SURGERY (CARDIOTHORACIC VASCULAR SURGERY)

## 2018-12-05 PROCEDURE — 51798 US URINE CAPACITY MEASURE: CPT

## 2018-12-05 PROCEDURE — 85730 THROMBOPLASTIN TIME PARTIAL: CPT

## 2018-12-05 PROCEDURE — 77030013798 HC KT TRNSDUC PRSSR EDWD -B: Performed by: THORACIC SURGERY (CARDIOTHORACIC VASCULAR SURGERY)

## 2018-12-05 PROCEDURE — 77030005402 HC CATH RAD ART LN KT TELE -B

## 2018-12-05 PROCEDURE — 74011250636 HC RX REV CODE- 250/636: Performed by: THORACIC SURGERY (CARDIOTHORACIC VASCULAR SURGERY)

## 2018-12-05 PROCEDURE — 74011250636 HC RX REV CODE- 250/636

## 2018-12-05 PROCEDURE — 74011250636 HC RX REV CODE- 250/636: Performed by: NURSE PRACTITIONER

## 2018-12-05 PROCEDURE — 77030008771 HC TU NG SALEM SUMP -A: Performed by: ANESTHESIOLOGY

## 2018-12-05 PROCEDURE — C1893 INTRO/SHEATH, FIXED,NON-PEEL: HCPCS | Performed by: THORACIC SURGERY (CARDIOTHORACIC VASCULAR SURGERY)

## 2018-12-05 PROCEDURE — 65610000003 HC RM ICU SURGICAL

## 2018-12-05 PROCEDURE — 71045 X-RAY EXAM CHEST 1 VIEW: CPT

## 2018-12-05 PROCEDURE — 36556 INSERT NON-TUNNEL CV CATH: CPT

## 2018-12-05 PROCEDURE — 74011000258 HC RX REV CODE- 258: Performed by: PHYSICIAN ASSISTANT

## 2018-12-05 PROCEDURE — 80053 COMPREHEN METABOLIC PANEL: CPT

## 2018-12-05 PROCEDURE — 85347 COAGULATION TIME ACTIVATED: CPT

## 2018-12-05 PROCEDURE — 77030018719 HC DRSG PTCH ANTIMIC J&J -A

## 2018-12-05 PROCEDURE — 77030026438 HC STYL ET INTUB CARD -A: Performed by: ANESTHESIOLOGY

## 2018-12-05 PROCEDURE — 76010000108 HC CV SURG 2 TO 2.5 HR: Performed by: THORACIC SURGERY (CARDIOTHORACIC VASCULAR SURGERY)

## 2018-12-05 PROCEDURE — 74011000258 HC RX REV CODE- 258: Performed by: THORACIC SURGERY (CARDIOTHORACIC VASCULAR SURGERY)

## 2018-12-05 PROCEDURE — 93355 ECHO TRANSESOPHAGEAL (TEE): CPT | Performed by: THORACIC SURGERY (CARDIOTHORACIC VASCULAR SURGERY)

## 2018-12-05 PROCEDURE — 85025 COMPLETE CBC W/AUTO DIFF WBC: CPT

## 2018-12-05 PROCEDURE — 94640 AIRWAY INHALATION TREATMENT: CPT

## 2018-12-05 PROCEDURE — 02HV33Z INSERTION OF INFUSION DEVICE INTO SUPERIOR VENA CAVA, PERCUTANEOUS APPROACH: ICD-10-PCS | Performed by: ANESTHESIOLOGY

## 2018-12-05 PROCEDURE — 77030020061 HC IV BLD WRMR ADMIN SET 3M -B: Performed by: ANESTHESIOLOGY

## 2018-12-05 PROCEDURE — 77030020263 HC SOL INJ SOD CL0.9% LFCR 1000ML: Performed by: THORACIC SURGERY (CARDIOTHORACIC VASCULAR SURGERY)

## 2018-12-05 PROCEDURE — 36415 COLL VENOUS BLD VENIPUNCTURE: CPT

## 2018-12-05 PROCEDURE — C1769 GUIDE WIRE: HCPCS | Performed by: THORACIC SURGERY (CARDIOTHORACIC VASCULAR SURGERY)

## 2018-12-05 PROCEDURE — 74011000250 HC RX REV CODE- 250: Performed by: PHYSICIAN ASSISTANT

## 2018-12-05 PROCEDURE — 02UG3JZ SUPPLEMENT MITRAL VALVE WITH SYNTHETIC SUBSTITUTE, PERCUTANEOUS APPROACH: ICD-10-PCS | Performed by: INTERNAL MEDICINE

## 2018-12-05 PROCEDURE — 83735 ASSAY OF MAGNESIUM: CPT

## 2018-12-05 PROCEDURE — 77030008684 HC TU ET CUF COVD -B: Performed by: ANESTHESIOLOGY

## 2018-12-05 PROCEDURE — C1751 CATH, INF, PER/CENT/MIDLINE: HCPCS | Performed by: THORACIC SURGERY (CARDIOTHORACIC VASCULAR SURGERY)

## 2018-12-05 PROCEDURE — 76001 XR FLUOROSCOPY OVER 60 MINUTES: CPT

## 2018-12-05 PROCEDURE — 36620 INSERTION CATHETER ARTERY: CPT

## 2018-12-05 DEVICE — MITRACLIP NTR CLIP DELIVERY SYSTEM
Type: IMPLANTABLE DEVICE | Site: MITRAL VALVE | Status: FUNCTIONAL
Brand: MITRACLIP

## 2018-12-05 DEVICE — IMPLANTABLE DEVICE: Type: IMPLANTABLE DEVICE | Site: MITRAL VALVE | Status: FUNCTIONAL

## 2018-12-05 RX ORDER — SODIUM CHLORIDE, SODIUM LACTATE, POTASSIUM CHLORIDE, CALCIUM CHLORIDE 600; 310; 30; 20 MG/100ML; MG/100ML; MG/100ML; MG/100ML
25 INJECTION, SOLUTION INTRAVENOUS CONTINUOUS
Status: DISCONTINUED | OUTPATIENT
Start: 2018-12-05 | End: 2018-12-05 | Stop reason: HOSPADM

## 2018-12-05 RX ORDER — SODIUM CHLORIDE 0.9 % (FLUSH) 0.9 %
5-10 SYRINGE (ML) INJECTION EVERY 8 HOURS
Status: DISCONTINUED | OUTPATIENT
Start: 2018-12-05 | End: 2018-12-05 | Stop reason: HOSPADM

## 2018-12-05 RX ORDER — ROPIVACAINE HYDROCHLORIDE 5 MG/ML
30 INJECTION, SOLUTION EPIDURAL; INFILTRATION; PERINEURAL AS NEEDED
Status: DISCONTINUED | OUTPATIENT
Start: 2018-12-05 | End: 2018-12-05 | Stop reason: HOSPADM

## 2018-12-05 RX ORDER — NALOXONE HYDROCHLORIDE 0.4 MG/ML
0.4 INJECTION, SOLUTION INTRAMUSCULAR; INTRAVENOUS; SUBCUTANEOUS AS NEEDED
Status: DISCONTINUED | OUTPATIENT
Start: 2018-12-05 | End: 2018-12-06

## 2018-12-05 RX ORDER — SODIUM CHLORIDE 0.9 % (FLUSH) 0.9 %
5-10 SYRINGE (ML) INJECTION AS NEEDED
Status: DISCONTINUED | OUTPATIENT
Start: 2018-12-05 | End: 2018-12-05 | Stop reason: HOSPADM

## 2018-12-05 RX ORDER — TRAMADOL HYDROCHLORIDE 50 MG/1
50-100 TABLET ORAL
Status: DISCONTINUED | OUTPATIENT
Start: 2018-12-05 | End: 2018-12-07 | Stop reason: HOSPADM

## 2018-12-05 RX ORDER — ONDANSETRON 2 MG/ML
INJECTION INTRAMUSCULAR; INTRAVENOUS AS NEEDED
Status: DISCONTINUED | OUTPATIENT
Start: 2018-12-05 | End: 2018-12-05 | Stop reason: HOSPADM

## 2018-12-05 RX ORDER — GLYCOPYRROLATE 0.2 MG/ML
INJECTION INTRAMUSCULAR; INTRAVENOUS AS NEEDED
Status: DISCONTINUED | OUTPATIENT
Start: 2018-12-05 | End: 2018-12-05 | Stop reason: HOSPADM

## 2018-12-05 RX ORDER — ONDANSETRON 2 MG/ML
4 INJECTION INTRAMUSCULAR; INTRAVENOUS
Status: DISCONTINUED | OUTPATIENT
Start: 2018-12-05 | End: 2018-12-07 | Stop reason: HOSPADM

## 2018-12-05 RX ORDER — SODIUM CHLORIDE 9 MG/ML
INJECTION, SOLUTION INTRAVENOUS
Status: DISCONTINUED | OUTPATIENT
Start: 2018-12-05 | End: 2018-12-05 | Stop reason: HOSPADM

## 2018-12-05 RX ORDER — FENTANYL CITRATE 50 UG/ML
50 INJECTION, SOLUTION INTRAMUSCULAR; INTRAVENOUS
Status: DISCONTINUED | OUTPATIENT
Start: 2018-12-05 | End: 2018-12-06

## 2018-12-05 RX ORDER — ROCURONIUM BROMIDE 10 MG/ML
INJECTION, SOLUTION INTRAVENOUS AS NEEDED
Status: DISCONTINUED | OUTPATIENT
Start: 2018-12-05 | End: 2018-12-05 | Stop reason: HOSPADM

## 2018-12-05 RX ORDER — CEFAZOLIN SODIUM/WATER 2 G/20 ML
2 SYRINGE (ML) INTRAVENOUS EVERY 8 HOURS
Status: COMPLETED | OUTPATIENT
Start: 2018-12-05 | End: 2018-12-06

## 2018-12-05 RX ORDER — OXYCODONE AND ACETAMINOPHEN 5; 325 MG/1; MG/1
1-2 TABLET ORAL
Status: DISCONTINUED | OUTPATIENT
Start: 2018-12-05 | End: 2018-12-07 | Stop reason: HOSPADM

## 2018-12-05 RX ORDER — SODIUM CHLORIDE 9 MG/ML
25 INJECTION, SOLUTION INTRAVENOUS CONTINUOUS
Status: DISCONTINUED | OUTPATIENT
Start: 2018-12-05 | End: 2018-12-05 | Stop reason: HOSPADM

## 2018-12-05 RX ORDER — MIDAZOLAM HYDROCHLORIDE 1 MG/ML
1 INJECTION, SOLUTION INTRAMUSCULAR; INTRAVENOUS AS NEEDED
Status: DISCONTINUED | OUTPATIENT
Start: 2018-12-05 | End: 2018-12-05 | Stop reason: HOSPADM

## 2018-12-05 RX ORDER — GUAIFENESIN 100 MG/5ML
81 LIQUID (ML) ORAL DAILY
Status: DISCONTINUED | OUTPATIENT
Start: 2018-12-06 | End: 2018-12-06

## 2018-12-05 RX ORDER — POTASSIUM CHLORIDE 29.8 MG/ML
20 INJECTION INTRAVENOUS ONCE
Status: COMPLETED | OUTPATIENT
Start: 2018-12-05 | End: 2018-12-05

## 2018-12-05 RX ORDER — SODIUM CHLORIDE 0.9 % (FLUSH) 0.9 %
5-10 SYRINGE (ML) INJECTION EVERY 8 HOURS
Status: DISCONTINUED | OUTPATIENT
Start: 2018-12-05 | End: 2018-12-06

## 2018-12-05 RX ORDER — PROTAMINE SULFATE 10 MG/ML
INJECTION, SOLUTION INTRAVENOUS AS NEEDED
Status: DISCONTINUED | OUTPATIENT
Start: 2018-12-05 | End: 2018-12-05 | Stop reason: HOSPADM

## 2018-12-05 RX ORDER — ACETAMINOPHEN 325 MG/1
650 TABLET ORAL
Status: DISCONTINUED | OUTPATIENT
Start: 2018-12-05 | End: 2018-12-07 | Stop reason: HOSPADM

## 2018-12-05 RX ORDER — SODIUM CHLORIDE 9 MG/ML
9 INJECTION, SOLUTION INTRAVENOUS CONTINUOUS
Status: DISCONTINUED | OUTPATIENT
Start: 2018-12-05 | End: 2018-12-06

## 2018-12-05 RX ORDER — LEVETIRACETAM 500 MG/1
500 TABLET ORAL 2 TIMES DAILY
Status: DISCONTINUED | OUTPATIENT
Start: 2018-12-05 | End: 2018-12-07 | Stop reason: HOSPADM

## 2018-12-05 RX ORDER — PROPOFOL 10 MG/ML
INJECTION, EMULSION INTRAVENOUS AS NEEDED
Status: DISCONTINUED | OUTPATIENT
Start: 2018-12-05 | End: 2018-12-05 | Stop reason: HOSPADM

## 2018-12-05 RX ORDER — ALBUMIN HUMAN 50 G/1000ML
25 SOLUTION INTRAVENOUS
Status: DISCONTINUED | OUTPATIENT
Start: 2018-12-05 | End: 2018-12-06

## 2018-12-05 RX ORDER — HEPARIN SODIUM 1000 [USP'U]/ML
INJECTION, SOLUTION INTRAVENOUS; SUBCUTANEOUS AS NEEDED
Status: DISCONTINUED | OUTPATIENT
Start: 2018-12-05 | End: 2018-12-05 | Stop reason: HOSPADM

## 2018-12-05 RX ORDER — SODIUM CHLORIDE 450 MG/100ML
10 INJECTION, SOLUTION INTRAVENOUS CONTINUOUS
Status: DISCONTINUED | OUTPATIENT
Start: 2018-12-05 | End: 2018-12-06

## 2018-12-05 RX ORDER — MAGNESIUM SULFATE 1 G/100ML
1 INJECTION INTRAVENOUS ONCE
Status: COMPLETED | OUTPATIENT
Start: 2018-12-05 | End: 2018-12-05

## 2018-12-05 RX ORDER — FACIAL-BODY WIPES
10 EACH TOPICAL DAILY PRN
Status: DISCONTINUED | OUTPATIENT
Start: 2018-12-05 | End: 2018-12-07 | Stop reason: HOSPADM

## 2018-12-05 RX ORDER — FENTANYL CITRATE 50 UG/ML
50 INJECTION, SOLUTION INTRAMUSCULAR; INTRAVENOUS AS NEEDED
Status: DISCONTINUED | OUTPATIENT
Start: 2018-12-05 | End: 2018-12-05 | Stop reason: HOSPADM

## 2018-12-05 RX ORDER — SODIUM CHLORIDE, SODIUM LACTATE, POTASSIUM CHLORIDE, CALCIUM CHLORIDE 600; 310; 30; 20 MG/100ML; MG/100ML; MG/100ML; MG/100ML
INJECTION, SOLUTION INTRAVENOUS
Status: DISCONTINUED | OUTPATIENT
Start: 2018-12-05 | End: 2018-12-05 | Stop reason: HOSPADM

## 2018-12-05 RX ORDER — ALBUTEROL SULFATE 0.83 MG/ML
2.5 SOLUTION RESPIRATORY (INHALATION)
Status: DISCONTINUED | OUTPATIENT
Start: 2018-12-05 | End: 2018-12-07 | Stop reason: HOSPADM

## 2018-12-05 RX ORDER — NEOSTIGMINE METHYLSULFATE 1 MG/ML
INJECTION INTRAVENOUS AS NEEDED
Status: DISCONTINUED | OUTPATIENT
Start: 2018-12-05 | End: 2018-12-05 | Stop reason: HOSPADM

## 2018-12-05 RX ORDER — AMOXICILLIN 250 MG
1 CAPSULE ORAL 2 TIMES DAILY
Status: DISCONTINUED | OUTPATIENT
Start: 2018-12-06 | End: 2018-12-07 | Stop reason: HOSPADM

## 2018-12-05 RX ORDER — SODIUM CHLORIDE 0.9 % (FLUSH) 0.9 %
5-10 SYRINGE (ML) INJECTION AS NEEDED
Status: DISCONTINUED | OUTPATIENT
Start: 2018-12-05 | End: 2018-12-06

## 2018-12-05 RX ORDER — ARFORMOTEROL TARTRATE 15 UG/2ML
15 SOLUTION RESPIRATORY (INHALATION)
Status: DISCONTINUED | OUTPATIENT
Start: 2018-12-05 | End: 2018-12-07 | Stop reason: HOSPADM

## 2018-12-05 RX ORDER — LANOLIN ALCOHOL/MO/W.PET/CERES
400 CREAM (GRAM) TOPICAL 2 TIMES DAILY
Status: DISCONTINUED | OUTPATIENT
Start: 2018-12-06 | End: 2018-12-07 | Stop reason: HOSPADM

## 2018-12-05 RX ORDER — BUDESONIDE 0.5 MG/2ML
500 INHALANT ORAL
Status: DISCONTINUED | OUTPATIENT
Start: 2018-12-05 | End: 2018-12-06

## 2018-12-05 RX ORDER — CEFAZOLIN SODIUM/WATER 2 G/20 ML
2 SYRINGE (ML) INTRAVENOUS ONCE
Status: COMPLETED | OUTPATIENT
Start: 2018-12-05 | End: 2018-12-05

## 2018-12-05 RX ORDER — FAMOTIDINE 20 MG/1
20 TABLET, FILM COATED ORAL DAILY
Status: DISCONTINUED | OUTPATIENT
Start: 2018-12-06 | End: 2018-12-07 | Stop reason: HOSPADM

## 2018-12-05 RX ORDER — LIDOCAINE HYDROCHLORIDE 10 MG/ML
0.1 INJECTION, SOLUTION EPIDURAL; INFILTRATION; INTRACAUDAL; PERINEURAL AS NEEDED
Status: DISCONTINUED | OUTPATIENT
Start: 2018-12-05 | End: 2018-12-05 | Stop reason: HOSPADM

## 2018-12-05 RX ADMIN — Medication 10 ML: at 22:18

## 2018-12-05 RX ADMIN — Medication 2 G: at 18:32

## 2018-12-05 RX ADMIN — NEOSTIGMINE METHYLSULFATE 2 MG: 1 INJECTION INTRAVENOUS at 12:53

## 2018-12-05 RX ADMIN — MAGNESIUM SULFATE IN DEXTROSE 1 G: 10 INJECTION, SOLUTION INTRAVENOUS at 16:02

## 2018-12-05 RX ADMIN — FAMOTIDINE 20 MG: 10 INJECTION, SOLUTION INTRAVENOUS at 13:42

## 2018-12-05 RX ADMIN — GLYCOPYRROLATE 0.4 MG: 0.2 INJECTION INTRAMUSCULAR; INTRAVENOUS at 12:53

## 2018-12-05 RX ADMIN — Medication 2 G: at 11:15

## 2018-12-05 RX ADMIN — PHENYLEPHRINE HYDROCHLORIDE 25 MCG/MIN: 10 INJECTION INTRAVENOUS at 11:05

## 2018-12-05 RX ADMIN — MIDAZOLAM HYDROCHLORIDE 2 MG: 1 INJECTION, SOLUTION INTRAMUSCULAR; INTRAVENOUS at 10:40

## 2018-12-05 RX ADMIN — HEPARIN SODIUM 1000 UNITS: 1000 INJECTION, SOLUTION INTRAVENOUS; SUBCUTANEOUS at 12:33

## 2018-12-05 RX ADMIN — ROCURONIUM BROMIDE 5 MG: 10 INJECTION, SOLUTION INTRAVENOUS at 12:01

## 2018-12-05 RX ADMIN — PROPOFOL 30 MG: 10 INJECTION, EMULSION INTRAVENOUS at 12:25

## 2018-12-05 RX ADMIN — SODIUM CHLORIDE, SODIUM LACTATE, POTASSIUM CHLORIDE, CALCIUM CHLORIDE: 600; 310; 30; 20 INJECTION, SOLUTION INTRAVENOUS at 10:50

## 2018-12-05 RX ADMIN — SODIUM CHLORIDE 5 MG/HR: 900 INJECTION, SOLUTION INTRAVENOUS at 21:44

## 2018-12-05 RX ADMIN — FENTANYL CITRATE 50 MCG: 50 INJECTION, SOLUTION INTRAMUSCULAR; INTRAVENOUS at 10:40

## 2018-12-05 RX ADMIN — Medication 10 ML: at 13:43

## 2018-12-05 RX ADMIN — POTASSIUM CHLORIDE 20 MEQ: 400 INJECTION, SOLUTION INTRAVENOUS at 14:52

## 2018-12-05 RX ADMIN — SODIUM CHLORIDE: 9 INJECTION, SOLUTION INTRAVENOUS at 10:50

## 2018-12-05 RX ADMIN — HEPARIN SODIUM 9000 UNITS: 1000 INJECTION, SOLUTION INTRAVENOUS; SUBCUTANEOUS at 11:55

## 2018-12-05 RX ADMIN — ARFORMOTEROL TARTRATE 15 MCG: 15 SOLUTION RESPIRATORY (INHALATION) at 19:47

## 2018-12-05 RX ADMIN — ONDANSETRON 4 MG: 2 INJECTION INTRAMUSCULAR; INTRAVENOUS at 12:51

## 2018-12-05 RX ADMIN — POTASSIUM CHLORIDE 20 MEQ: 400 INJECTION, SOLUTION INTRAVENOUS at 18:28

## 2018-12-05 RX ADMIN — ROCURONIUM BROMIDE 45 MG: 10 INJECTION, SOLUTION INTRAVENOUS at 11:00

## 2018-12-05 RX ADMIN — PROPOFOL 110 MG: 10 INJECTION, EMULSION INTRAVENOUS at 11:00

## 2018-12-05 RX ADMIN — SODIUM CHLORIDE 5 MG/HR: 900 INJECTION, SOLUTION INTRAVENOUS at 12:59

## 2018-12-05 RX ADMIN — SODIUM CHLORIDE 3 ML/HR: 900 INJECTION, SOLUTION INTRAVENOUS at 13:42

## 2018-12-05 RX ADMIN — PROTAMINE SULFATE 90 MG: 10 INJECTION, SOLUTION INTRAVENOUS at 12:51

## 2018-12-05 RX ADMIN — BUDESONIDE 500 MCG: 0.5 INHALANT RESPIRATORY (INHALATION) at 19:47

## 2018-12-05 RX ADMIN — LEVETIRACETAM 500 MG: 500 TABLET ORAL at 18:32

## 2018-12-05 RX ADMIN — SODIUM CHLORIDE 10 ML/HR: 450 INJECTION, SOLUTION INTRAVENOUS at 13:40

## 2018-12-05 NOTE — ANESTHESIA PREPROCEDURE EVALUATION
Anesthetic History No history of anesthetic complications Review of Systems / Medical History Patient summary reviewed, nursing notes reviewed and pertinent labs reviewed Pulmonary Asthma : well controlled Neuro/Psych  
 
seizures: well controlled Psychiatric history Cardiovascular Hypertension Valvular problems/murmurs: mitral insufficiency Dysrhythmias : atrial fibrillation CAD and cardiac stents Exercise tolerance: <4 METS 
  
GI/Hepatic/Renal 
  
 
 
Renal disease: CRI Endo/Other Diabetes Hypothyroidism Obesity and arthritis Other Findings Physical Exam 
 
Airway Mallampati: II 
TM Distance: 4 - 6 cm Neck ROM: normal range of motion Mouth opening: Normal 
 
 Cardiovascular Rhythm: regular Rate: normal 
 
Murmur, Mitral area Dental 
 
Dentition: Edentulous Pulmonary Breath sounds clear to auscultation Abdominal 
GI exam deferred Other Findings Anesthetic Plan ASA: 4 Anesthesia type: general 
 
Monitoring Plan: Arterial line, CVP and VIGNESH Induction: Intravenous Anesthetic plan and risks discussed with: Patient

## 2018-12-05 NOTE — H&P
Date of Surgery Update: 
Dennis Herbert was seen and examined. History and physical has been reviewed. The patient has been examined. There have been no significant clinical changes since the completion of the originally dated History and Physical. 
 
Signed By: GIANLUCA Bah  December 5, 2018 9:44 AM

## 2018-12-05 NOTE — ANESTHESIA PROCEDURE NOTES
Arterial Line Placement Start time: 12/5/2018 10:11 AM 
End time: 12/5/2018 10:17 AM 
Performed by: Ilana Richard MD 
Authorized by: Ilana Richard MD  
 
Pre-Procedure Indications:  Arterial pressure monitoring Preanesthetic Checklist: patient identified, risks and benefits discussed, anesthesia consent, site marked, patient being monitored, timeout performed and patient being monitored Procedure:  
Prep:  Chlorhexidine Seldinger Technique?: Yes Orientation:  Right Location:  Radial artery Catheter size:  20 G Number of attempts:  1 Cont Cardiac Output Sensor: No   
 
Assessment:  
Post-procedure:  Line secured and sterile dressing applied Patient Tolerance:  Patient tolerated the procedure well with no immediate complications

## 2018-12-05 NOTE — PROGRESS NOTES
Underwent transcatheter mitral valve repair with two clips (one NTR and one XTR) with reduction in MR from severe to mild/mod, and no complications.

## 2018-12-05 NOTE — PROGRESS NOTES
TRANSFER - IN REPORT: 
 
 Jodee Ceja  being received from OR(unit) for routine post - op Report consisted of patients Situation, Background, Assessment and  
Recommendations(SBAR). Information from the following report(s) SBAR, Kardex, OR Summary, Procedure Summary, Intake/Output, MAR, Accordion, Recent Results, Med Rec Status, Cardiac Rhythm NSR, Alarm Parameters  and Quality Measures was reviewed with the receiving nurse. Opportunity for questions and clarification was provided. Assessment completed upon patients arrival to unit and care assumed. Dr Yeison Wayne, CRNA at  - updated to status/report received. 1400:Primary Nurse Alisa Celeste and Ulysses Mario, RN performed a dual skin assessment on this patient Impairment noted- see wound doc flow sheet Sunil score is 15 
 
1530:Bedside shift change report given to Sylvia (oncoming nurse) by Alisa Celeste (offgoing nurse). Report included the following information SBAR, Kardex, OR Summary, Procedure Summary, Intake/Output, MAR, Accordion, Recent Results, Med Rec Status, Cardiac Rhythm NSR w pvcs, Alarm Parameters  and Quality Measures.

## 2018-12-05 NOTE — CARDIO/PULMONARY
Cardiac Rehab: Trans-catheter education folder given to [facility], s/p mitral clip procedure.  Gabino Jeffrey RN

## 2018-12-05 NOTE — ANESTHESIA PROCEDURE NOTES
VIGNESH 
 
 
 
Procedure Details: probe placement, image aquisition & interpretation Risks and benefits discussed with the patient and plans are to proceed Procedure Note Performed by: Ilana Richard MD 
Authorized by: Ilana Richard MD  
 
 
Indications: assessment of surgical repair Modalities: 2D, CF, CWD, PWD (3D) Probe Type: multiplane Insertion: atraumatic Patient Status: intubated and sedated Echocardiographic and Doppler Measurements Aorta  Size  Diam(cm)  Dissection PlaqueThick(mm)  Plaque Mobile Ascending normal  No 0-3 No  
 Arch normal  No 0-3 No  
 Descending normal  No >3 No  
 
 
 
 Valves  Annulus  Stenosis  Area/Grad  Regurg  Leaflet Morph  Leaflet Motion Aortic normal none  0 normal normal  
 Mitral dilated none MG=3, central jet 4+ normal normal  
 Tricuspid normal none  2+ normal normal  
 
 
 
 Atria  Size  SEC (smoke)  Thrombus  Tumor  Device Rt Atrium dilated No No No   
 Lt Atrium dilated No No No   
 
Interatrial Septum Morphology: patent foramen ovale Interventricular Septum Morphology: normal 
 
Ventricle  Cavity Size  Cavity Dimension Hypertrophy  Thrombus  Gloal FXN  EF  
 RV normal  No no normal   
 LV normal   No normal 55% Regional Function 
(1 = normal, 2 = mildly hypokinetic, 3 = severely hypokinetic, 4 = akinetic, 5 = dyskinetic) LAV - Long [place] ME LAV = 0  ME LAV = 90  ME LAV = 130 Basal Sept:1 Basal Ant:1 Basal Post:1 Mid Sept:1 Mid Ant:1 Mid Post:1 Apical Sept:1 Apical Ant:1 Basal Ant Sept:1 Basal Lat:1 Basal Inf:1 Mid Ant Sept:1 Mid Lat:1 Mid Inf:1 Apical Lat:1 Apical Inf:1   
 
 
Pericardium: normal 
 
Post Intervention Follow-up Study Ventricular Global Function: unchanged Ventricular Regional Function: unchanged Valve  Function  Regurgitation  Area Aortic Mitral improved 2+ Tricuspid Prosthetic Complications: None Comments: Mitraclip x 2 clips at A2/P2, 2+ residual MR, MG=5, Left to right shunt at transseptal site

## 2018-12-05 NOTE — PROGRESS NOTES
Cardiac Surgery Care Coordinator- Met with the family of Domi Szymanski, introduced role of the Cardiac Surgery Co- Nurse. Reviewed plan of care and day of surgery expectations. Provided family with a contact number and an update from OR. Encouraged family to verbalize and emotional support given. Will continue to update throughout the day. 1315- Met with Freda Suhas Hahn's family and Ramone Crenshaw and Ratio given, Encouraged family to verbalize and offered emotional support. Reinforced Surgical waiting room instructions, family to wait in the main surgical waiting room until contacted by the nursing staff. 26- Escorted family to the bedside in CVICU, reviewed plan of care and offered emotional support. Pt's family will be waiting in the fourth floor waiting room. Will continue to follow.  Josh Funez RN

## 2018-12-05 NOTE — ANESTHESIA PROCEDURE NOTES
Central Line Placement Start time: 12/5/2018 10:25 AM 
End time: 12/5/2018 10:39 AM 
Performed by: Inderjit Paulson MD 
Authorized by: Inderjit Paluson MD  
 
Indications: vascular access Preanesthetic Checklist: patient identified, risks and benefits discussed, anesthesia consent, site marked, patient being monitored and timeout performed Pre-procedure: All elements of maximal sterile barrier technique followed? Yes   
2% Chlorhexidine for cutaneous antisepsis, Hand hygiene performed prior to catheter insertion and Ultrasound guidance Sterile Ultrasound Technique followed?: Yes Procedure:  
Prep:  Chlorhexidine Location:  Internal jugular Orientation:  Right Patient position:  Trendelenburg Catheter type:  Quad lumen Catheter size:  7 Fr Catheter length:  16 cm Number of attempts:  1 Successful placement: Yes Assessment:  
Post-procedure:  Catheter secured and sterile dressing with CHG applied Assessment:  Blood return through all ports Insertion:  Uncomplicated Patient tolerance:  Patient tolerated the procedure well with no immediate complications

## 2018-12-06 ENCOUNTER — APPOINTMENT (OUTPATIENT)
Dept: GENERAL RADIOLOGY | Age: 73
DRG: 229 | End: 2018-12-06
Attending: PHYSICIAN ASSISTANT
Payer: MEDICARE

## 2018-12-06 LAB
ABO + RH BLD: NORMAL
ALBUMIN SERPL-MCNC: 3.1 G/DL (ref 3.5–5)
ALBUMIN/GLOB SERPL: 1.2 {RATIO} (ref 1.1–2.2)
ALP SERPL-CCNC: 37 U/L (ref 45–117)
ALT SERPL-CCNC: 8 U/L (ref 12–78)
ANION GAP SERPL CALC-SCNC: 6 MMOL/L (ref 5–15)
AST SERPL-CCNC: 12 U/L (ref 15–37)
ATRIAL RATE: 59 BPM
BILIRUB SERPL-MCNC: 0.4 MG/DL (ref 0.2–1)
BLD PROD TYP BPU: NORMAL
BLD PROD TYP BPU: NORMAL
BLOOD GROUP ANTIBODIES SERPL: NORMAL
BPU ID: NORMAL
BPU ID: NORMAL
BUN SERPL-MCNC: 16 MG/DL (ref 6–20)
BUN/CREAT SERPL: 12 (ref 12–20)
CALCIUM SERPL-MCNC: 8.2 MG/DL (ref 8.5–10.1)
CALCULATED P AXIS, ECG09: -50 DEGREES
CALCULATED R AXIS, ECG10: 46 DEGREES
CALCULATED T AXIS, ECG11: 6 DEGREES
CHLORIDE SERPL-SCNC: 111 MMOL/L (ref 97–108)
CO2 SERPL-SCNC: 23 MMOL/L (ref 21–32)
CREAT SERPL-MCNC: 1.29 MG/DL (ref 0.55–1.02)
CROSSMATCH RESULT,%XM: NORMAL
CROSSMATCH RESULT,%XM: NORMAL
DIAGNOSIS, 93000: NORMAL
ERYTHROCYTE [DISTWIDTH] IN BLOOD BY AUTOMATED COUNT: 13.8 % (ref 11.5–14.5)
GLOBULIN SER CALC-MCNC: 2.5 G/DL (ref 2–4)
GLUCOSE SERPL-MCNC: 86 MG/DL (ref 65–100)
HCT VFR BLD AUTO: 27.6 % (ref 35–47)
HGB BLD-MCNC: 8.5 G/DL (ref 11.5–16)
MAGNESIUM SERPL-MCNC: 2.1 MG/DL (ref 1.6–2.4)
MCH RBC QN AUTO: 29.1 PG (ref 26–34)
MCHC RBC AUTO-ENTMCNC: 30.8 G/DL (ref 30–36.5)
MCV RBC AUTO: 94.5 FL (ref 80–99)
NRBC # BLD: 0 K/UL (ref 0–0.01)
NRBC BLD-RTO: 0 PER 100 WBC
P-R INTERVAL, ECG05: 156 MS
PLATELET # BLD AUTO: 223 K/UL (ref 150–400)
PMV BLD AUTO: 10.1 FL (ref 8.9–12.9)
POTASSIUM SERPL-SCNC: 4.4 MMOL/L (ref 3.5–5.1)
PROT SERPL-MCNC: 5.6 G/DL (ref 6.4–8.2)
Q-T INTERVAL, ECG07: 478 MS
QRS DURATION, ECG06: 88 MS
QTC CALCULATION (BEZET), ECG08: 473 MS
RBC # BLD AUTO: 2.92 M/UL (ref 3.8–5.2)
SODIUM SERPL-SCNC: 140 MMOL/L (ref 136–145)
SPECIMEN EXP DATE BLD: NORMAL
STATUS OF UNIT,%ST: NORMAL
STATUS OF UNIT,%ST: NORMAL
T3FREE SERPL-MCNC: 1.6 PG/ML (ref 2.2–4)
T4 FREE SERPL-MCNC: 2 NG/DL (ref 0.8–1.5)
TSH SERPL DL<=0.05 MIU/L-ACNC: 0.02 UIU/ML (ref 0.36–3.74)
UNIT DIVISION, %UDIV: 0
UNIT DIVISION, %UDIV: 0
VENTRICULAR RATE, ECG03: 59 BPM
WBC # BLD AUTO: 4.7 K/UL (ref 3.6–11)

## 2018-12-06 PROCEDURE — 94640 AIRWAY INHALATION TREATMENT: CPT

## 2018-12-06 PROCEDURE — G8987 SELF CARE CURRENT STATUS: HCPCS

## 2018-12-06 PROCEDURE — 74011250637 HC RX REV CODE- 250/637: Performed by: THORACIC SURGERY (CARDIOTHORACIC VASCULAR SURGERY)

## 2018-12-06 PROCEDURE — 74011000258 HC RX REV CODE- 258: Performed by: THORACIC SURGERY (CARDIOTHORACIC VASCULAR SURGERY)

## 2018-12-06 PROCEDURE — 74011250636 HC RX REV CODE- 250/636: Performed by: THORACIC SURGERY (CARDIOTHORACIC VASCULAR SURGERY)

## 2018-12-06 PROCEDURE — 36415 COLL VENOUS BLD VENIPUNCTURE: CPT

## 2018-12-06 PROCEDURE — G8988 SELF CARE GOAL STATUS: HCPCS

## 2018-12-06 PROCEDURE — 51798 US URINE CAPACITY MEASURE: CPT

## 2018-12-06 PROCEDURE — 71045 X-RAY EXAM CHEST 1 VIEW: CPT

## 2018-12-06 PROCEDURE — 97535 SELF CARE MNGMENT TRAINING: CPT

## 2018-12-06 PROCEDURE — 74011250637 HC RX REV CODE- 250/637: Performed by: NURSE PRACTITIONER

## 2018-12-06 PROCEDURE — 74011250636 HC RX REV CODE- 250/636: Performed by: PHYSICIAN ASSISTANT

## 2018-12-06 PROCEDURE — 74011000250 HC RX REV CODE- 250: Performed by: THORACIC SURGERY (CARDIOTHORACIC VASCULAR SURGERY)

## 2018-12-06 PROCEDURE — 97161 PT EVAL LOW COMPLEX 20 MIN: CPT

## 2018-12-06 PROCEDURE — 65660000001 HC RM ICU INTERMED STEPDOWN

## 2018-12-06 PROCEDURE — 84443 ASSAY THYROID STIM HORMONE: CPT

## 2018-12-06 PROCEDURE — 97165 OT EVAL LOW COMPLEX 30 MIN: CPT

## 2018-12-06 PROCEDURE — 93005 ELECTROCARDIOGRAM TRACING: CPT

## 2018-12-06 PROCEDURE — 97116 GAIT TRAINING THERAPY: CPT

## 2018-12-06 PROCEDURE — 83735 ASSAY OF MAGNESIUM: CPT

## 2018-12-06 PROCEDURE — 84481 FREE ASSAY (FT-3): CPT

## 2018-12-06 PROCEDURE — 77030027138 HC INCENT SPIROMETER -A

## 2018-12-06 PROCEDURE — 84439 ASSAY OF FREE THYROXINE: CPT

## 2018-12-06 PROCEDURE — 85027 COMPLETE CBC AUTOMATED: CPT

## 2018-12-06 PROCEDURE — 80053 COMPREHEN METABOLIC PANEL: CPT

## 2018-12-06 PROCEDURE — 74011250637 HC RX REV CODE- 250/637: Performed by: PHYSICIAN ASSISTANT

## 2018-12-06 PROCEDURE — 74011000250 HC RX REV CODE- 250

## 2018-12-06 PROCEDURE — G8979 MOBILITY GOAL STATUS: HCPCS

## 2018-12-06 PROCEDURE — G8978 MOBILITY CURRENT STATUS: HCPCS

## 2018-12-06 RX ORDER — SODIUM CHLORIDE 0.9 % (FLUSH) 0.9 %
5-10 SYRINGE (ML) INJECTION EVERY 8 HOURS
Status: DISCONTINUED | OUTPATIENT
Start: 2018-12-06 | End: 2018-12-07 | Stop reason: HOSPADM

## 2018-12-06 RX ORDER — ATORVASTATIN CALCIUM 40 MG/1
40 TABLET, FILM COATED ORAL
Status: DISCONTINUED | OUTPATIENT
Start: 2018-12-06 | End: 2018-12-07 | Stop reason: HOSPADM

## 2018-12-06 RX ORDER — CETIRIZINE HCL 10 MG
10 TABLET ORAL DAILY
Status: DISCONTINUED | OUTPATIENT
Start: 2018-12-06 | End: 2018-12-07 | Stop reason: HOSPADM

## 2018-12-06 RX ORDER — LEVOTHYROXINE SODIUM 100 UG/1
100 TABLET ORAL
Status: DISCONTINUED | OUTPATIENT
Start: 2018-12-07 | End: 2018-12-07 | Stop reason: HOSPADM

## 2018-12-06 RX ORDER — METOPROLOL TARTRATE 25 MG/1
12.5 TABLET, FILM COATED ORAL 2 TIMES DAILY
Status: DISCONTINUED | OUTPATIENT
Start: 2018-12-06 | End: 2018-12-07

## 2018-12-06 RX ORDER — SODIUM CHLORIDE 0.9 % (FLUSH) 0.9 %
5-10 SYRINGE (ML) INJECTION AS NEEDED
Status: DISCONTINUED | OUTPATIENT
Start: 2018-12-06 | End: 2018-12-07 | Stop reason: HOSPADM

## 2018-12-06 RX ORDER — DIPHENHYDRAMINE HCL 25 MG
25 CAPSULE ORAL
Status: DISCONTINUED | OUTPATIENT
Start: 2018-12-06 | End: 2018-12-07 | Stop reason: HOSPADM

## 2018-12-06 RX ORDER — CLOPIDOGREL BISULFATE 75 MG/1
75 TABLET ORAL DAILY
Status: DISCONTINUED | OUTPATIENT
Start: 2018-12-06 | End: 2018-12-07 | Stop reason: HOSPADM

## 2018-12-06 RX ORDER — OXYBUTYNIN CHLORIDE 5 MG/1
5 TABLET ORAL 2 TIMES DAILY
Status: DISCONTINUED | OUTPATIENT
Start: 2018-12-06 | End: 2018-12-07 | Stop reason: HOSPADM

## 2018-12-06 RX ORDER — AMIODARONE HYDROCHLORIDE 200 MG/1
200 TABLET ORAL DAILY
Status: DISCONTINUED | OUTPATIENT
Start: 2018-12-06 | End: 2018-12-07 | Stop reason: HOSPADM

## 2018-12-06 RX ORDER — BACITRACIN 500 UNIT/G
PACKET (EA) TOPICAL
Status: COMPLETED
Start: 2018-12-06 | End: 2018-12-06

## 2018-12-06 RX ADMIN — Medication 10 ML: at 23:09

## 2018-12-06 RX ADMIN — OXYBUTYNIN CHLORIDE 5 MG: 5 TABLET ORAL at 17:44

## 2018-12-06 RX ADMIN — LEVETIRACETAM 500 MG: 500 TABLET ORAL at 08:33

## 2018-12-06 RX ADMIN — APIXABAN 5 MG: 5 TABLET, FILM COATED ORAL at 17:44

## 2018-12-06 RX ADMIN — SENNOSIDES AND DOCUSATE SODIUM 1 TABLET: 8.6; 5 TABLET ORAL at 17:44

## 2018-12-06 RX ADMIN — Medication 400 MG: at 08:33

## 2018-12-06 RX ADMIN — SENNOSIDES AND DOCUSATE SODIUM 1 TABLET: 8.6; 5 TABLET ORAL at 08:33

## 2018-12-06 RX ADMIN — APIXABAN 5 MG: 5 TABLET, FILM COATED ORAL at 10:04

## 2018-12-06 RX ADMIN — PHENYLEPHRINE HYDROCHLORIDE 30 MCG/MIN: 10 INJECTION, SOLUTION INTRAMUSCULAR; INTRAVENOUS; SUBCUTANEOUS at 02:30

## 2018-12-06 RX ADMIN — LEVETIRACETAM 500 MG: 500 TABLET ORAL at 17:44

## 2018-12-06 RX ADMIN — Medication 10 ML: at 15:34

## 2018-12-06 RX ADMIN — DIPHENHYDRAMINE HYDROCHLORIDE 25 MG: 25 CAPSULE ORAL at 23:09

## 2018-12-06 RX ADMIN — OXYBUTYNIN CHLORIDE 5 MG: 5 TABLET ORAL at 10:04

## 2018-12-06 RX ADMIN — Medication 2 G: at 10:04

## 2018-12-06 RX ADMIN — ACETAMINOPHEN 650 MG: 325 TABLET, FILM COATED ORAL at 13:18

## 2018-12-06 RX ADMIN — AMIODARONE HYDROCHLORIDE 200 MG: 200 TABLET ORAL at 10:04

## 2018-12-06 RX ADMIN — FAMOTIDINE 20 MG: 20 TABLET ORAL at 08:33

## 2018-12-06 RX ADMIN — Medication 10 ML: at 06:20

## 2018-12-06 RX ADMIN — Medication 2 G: at 03:37

## 2018-12-06 RX ADMIN — ARFORMOTEROL TARTRATE 15 MCG: 15 SOLUTION RESPIRATORY (INHALATION) at 22:32

## 2018-12-06 RX ADMIN — LEVOTHYROXINE SODIUM 137 MCG: 112 TABLET ORAL at 07:07

## 2018-12-06 RX ADMIN — BACITRACIN 1 PACKET: 500 OINTMENT TOPICAL at 10:04

## 2018-12-06 RX ADMIN — CLOPIDOGREL BISULFATE 75 MG: 75 TABLET, FILM COATED ORAL at 10:04

## 2018-12-06 RX ADMIN — SODIUM CHLORIDE 2.5 MG/HR: 900 INJECTION, SOLUTION INTRAVENOUS at 01:08

## 2018-12-06 RX ADMIN — ATORVASTATIN CALCIUM 40 MG: 40 TABLET, FILM COATED ORAL at 23:09

## 2018-12-06 RX ADMIN — ARFORMOTEROL TARTRATE 15 MCG: 15 SOLUTION RESPIRATORY (INHALATION) at 07:18

## 2018-12-06 RX ADMIN — CETIRIZINE HYDROCHLORIDE 10 MG: 10 TABLET, FILM COATED ORAL at 10:04

## 2018-12-06 RX ADMIN — METOPROLOL TARTRATE 12.5 MG: 25 TABLET ORAL at 17:44

## 2018-12-06 RX ADMIN — Medication 400 MG: at 17:44

## 2018-12-06 NOTE — PROGRESS NOTES
2000: Report received from [place], RN and care assumed of patient. 0000: Patient placed on bedpan, states she feels the urge to void. 0030: Patient off bedpan. Voided 75ml. 0045: Bladder scan yields 504ml. 4656: Straight cath for 725ml. 0700: Patient bathed, linen changed and oob to chair. Using IS to 1250. 
 
0800: Bedside shift change report given to Susie Niño RN (oncoming nurse) by Shankar Harrison RN (offgoing nurse). Report included the following information SBAR, Kardex, Procedure Summary, Intake/Output, Accordion and Recent Results.

## 2018-12-06 NOTE — PROGRESS NOTES
Problem: Self Care Deficits Care Plan (Adult) Goal: *Acute Goals and Plan of Care (Insert Text) Occupational Therapy Goals Initiated 12/6/2018 1. Patient will perform grooming standing at sink without fatigue or LOB with supervision/set-up within 7 day(s). 2.  Patient will perform lower body dressing with supervision/set-up using AE PRN within 7 day(s). 3.  Patient will perform bathing with supervision/set-up within 7 day(s). 4.  Patient will perform toilet transfers with supervision/set-up within 7 day(s). 5.  Patient will perform all aspects of toileting with supervision/set-up within 7 day(s). 6.  Patient will participate in upper extremity therapeutic exercise/activities with supervision/set-up for 10 minutes within 7 day(s). 7.  Patient will utilize energy conservation techniques during functional activities with verbal cues within 7 day(s). Occupational Therapy EVALUATION Patient: Layla Young (60 y.o. female) Date: 12/6/2018 Primary Diagnosis: MITRAL REGURGITATION Mitral regurgitation Procedure(s) (LRB): 
TRANSCATHETER MITRAL VALVE REPAIR, MITRAL CLIP (N/A) 1 Day Post-Op Precautions: fall ASSESSMENT : 
Based on the objective data described below, the patient presents with impaired cardiopulmonary endurance, R hip pain and back pain with mobility, and impaired standing balance resulting in impaired functional mobility and impaired ADL independence. Patient received sitting in chair, alert and agreeable to therapy. Performed sit-stand with CGA and stepped ~5 feet to toilet with heavy HHA/ min-A, performed toilet transfer with CGA. Patient ambulated with physical therapist with heavy HHA and would benefit from AD for mobility (see PT note). Patient reports difficulty with LB dressing at baseline due to back pain, would benefit from AE training. Infer patient currently requires overall SPV to mod-A for ADLs.   PTA, patient living alone in single story house with ramp to enter, indep with ADLs (although reports difficulty with LB access), furniture walking in home and using [facility] or rollator in community, reports no falls. Anticipate d/c with home health pending progress. Patient will benefit from skilled intervention to address the above impairments. Patients rehabilitation potential is considered to be Good Factors which may influence rehabilitation potential include:  
[x]             None noted []             Mental ability/status []             Medical condition []             Home/family situation and support systems []             Safety awareness []             Pain tolerance/management 
[]             Other: PLAN : 
Recommendations and Planned Interventions: 
[x]               Self Care Training                  [x]        Therapeutic Activities [x]               Functional Mobility Training    []        Cognitive Retraining 
[x]               Therapeutic Exercises           [x]        Endurance Activities [x]               Balance Training                   []        Neuromuscular Re-Education []               Visual/Perceptual Training     [x]   Home Safety Training 
[x]               Patient Education                 [x]        Family Training/Education []               Other (comment): Frequency/Duration: Patient will be followed by occupational therapy 5 times a week to address goals. Discharge Recommendations:anticipate home health pending progress Further Equipment Recommendations for Discharge: TBD, AE kit for LB dressing/ bathing SUBJECTIVE:  
Patient stated My [right] hip doesn't normally hurt like this.  OBJECTIVE DATA SUMMARY:  
HISTORY:  
Past Medical History:  
Diagnosis Date  Arrhythmia \"SKIPS A BEAT\"  Arthritis  Asthma  Chronic pain BACK  Diabetes ([facility] [address] BORDERLINE  Endocrine disease   
 thyroid  Hypertension  Psychiatric disorder   
 depression  Seizures ([facility] [address] \"I'M TAKING MEDICINE FOR SEIZURES, THEY SAY I HAD ONE, ONE TIME\"  Thromboembolus ([facility] [address] LEFT DVT  Thyroid disease Past Surgical History:  
Procedure Laterality Date  HX APPENDECTOMY  HX CHOLECYSTECTOMY  HX ORTHOPAEDIC    
 LEFT TKR  HX OTHER SURGICAL SKIN GRAFT ON LEFT LEG  
 HX SON AND BSO Prior Level of Function/Environment/Context:  PTA, patient living alone in single story house with ramp to enter, indep with ADLs (although reports difficulty with LB access), furniture walking in home and using [facility] or rollator in community, reports no falls. Expanded or extensive additional review of patient history:  
 
Home Situation Home Environment: Private residence # Steps to Enter: 0 Wheelchair Ramp: Yes One/Two Story Residence: One story Living Alone: Yes Support Systems: Child(kasandra) Patient Expects to be Discharged to[de-identified] Private residence Current DME Used/Available at Home: Dartha Sowmya, rollator, John Lin, straight Tub or Shower Type: Shower EXAMINATION OF PERFORMANCE DEFICITS: 
Cognitive/Behavioral Status: 
Neurologic State: Alert Orientation Level: Oriented X4 Cognition: Appropriate decision making; Appropriate for age attention/concentration; Appropriate safety awareness; Follows commands Perception: Appears intact Perseveration: No perseveration noted Safety/Judgement: Awareness of environment; Insight into deficits Skin: visible skin appears intact Edema: none noted Hearing: Auditory Auditory Impairment: None Hearing Aids/Status: (NA) Vision/Perceptual:   
    
    
    
  
    
Acuity: Within Defined Limits Range of Motion: 
 
AROM: Within functional limits Strength: 
 
Strength: Within functional limits Coordination: 
Coordination: Within functional limits Fine Motor Skills-Upper: Left Intact; Right Intact Gross Motor Skills-Upper: Left Intact; Right Intact Tone & Sensation: 
 
Tone: Normal 
Sensation: Intact Balance: 
Sitting: Intact Standing: Impaired Standing - Static: Fair;Constant support Standing - Dynamic : Fair Functional Mobility and Transfers for ADLs:Bed Mobility: 
Supine to Sit: (Received sitting in chair) Transfers: 
Sit to Stand: Contact guard assistance Stand to Sit: Contact guard assistance Toilet Transfer : Contact guard assistance ADL Assessment: 
Feeding: Independent(inferred) Oral Facial Hygiene/Grooming: Setup(inferred) Bathing: Moderate assistance(inferred due to impaired LB access, back pain) Upper Body Dressing: Supervision(inferred) Lower Body Dressing: Moderate assistance(inferred due to impaired LB access) Toileting: Contact guard assistance(inferred) ADL Intervention and task modifications: 
  
 
  
  
 
  
 
Cognitive Retraining Safety/Judgement: Awareness of environment; Insight into deficits Functional Measure: 
Barthel Index: 
 
Bathin Bladder: 10 Bowels: 10 
Groomin Dressin Feeding: 10 Mobility: 0 Stairs: 0 Toilet Use: 5 Transfer (Bed to Chair and Back): 10 Total: 55 Barthel and G-code impairment scale: 
Percentage of impairment CH 
0% CI 
1-19% CJ 
20-39% CK 
40-59% CL 
60-79% CM 
80-99% CN 
100% Barthel Score 0-100 100 99-80 79-60 59-40 20-39 1-19 
 0 Barthel Score 0-20 20 17-19 13-16 9-12 5-8 1-4 0 The Barthel ADL Index: Guidelines 1. The index should be used as a record of what a patient does, not as a record of what a patient could do. 2. The main aim is to establish degree of independence from any help, physical or verbal, however minor and for whatever reason. 3. The need for supervision renders the patient not independent. 4. A patient's performance should be established using the best available evidence. Asking the patient, friends/relatives and nurses are the usual sources, but direct observation and common sense are also important. However direct testing is not needed. 5. Usually the patient's performance over the preceding 24-48 hours is important, but occasionally longer periods will be relevant. 6. Middle categories imply that the patient supplies over 50 per cent of the effort. 7. Use of aids to be independent is allowed. Niko Orozco., Barthel, D.W. (6795). Functional evaluation: the Barthel Index. [address] [facility] (14)2. [facility] linda KYLE Rubin, Jt Nowak., Adina Bernheim., [place], [address] [place] [address] Ave (1999). Measuring the change indisability after inpatient rehabilitation; comparison of the responsiveness of the Barthel Index and Functional [place] Measure. Journal of Neurology, Neurosurgery, and Psychiatry, 66(4), 438-007. LAKESHIA Ibanez, JOSE Peuntes, & Grace Santiago M.A. (2004.) Assessment of post-stroke quality of life in cost-effectiveness studies: The usefulness of the Barthel Index and the EuroQoL-5D. [facility], 93, 446-10 G codes: In compliance with CMSs Claims Based Outcome Reporting, the following G-code set was chosen for this patient based on their primary functional limitation being treated: The outcome measure chosen to determine the severity of the functional limitation was the Barthel Index with a score of 55/100 which was correlated with the impairment scale. ? Self Care:  
  - CURRENT STATUS: CK - 40%-59% impaired, limited or restricted  - GOAL STATUS: CI - 1%-19% impaired, limited or restricted  - D/C STATUS:  ---------------To be determined--------------- Occupational Therapy Evaluation Charge Determination History Examination Decision-Making LOW Complexity : Brief history review  MEDIUM Complexity : 3-5 performance deficits relating to physical, cognitive , or psychosocial skils that result in activity limitations and / or participation restrictions LOW Complexity : No comorbidities that affect functional and no verbal or physical assistance needed to complete eval tasks Based on the above components, the patient evaluation is determined to be of the following complexity level: LOW Pain: 
Pain Scale 1: Numeric (0 - 10) Pain Intensity 1: 0 Activity Tolerance:  
HR elevated to 109 ambulating, decreased 80s with seated rest.  /41, SPO2 98% on RA After treatment:  
[x] Patient left in no apparent distress sitting up in chair 
[] Patient left in no apparent distress in bed 
[x] Call bell left within reach [x] Nursing notified 
[] Caregiver present 
[] Bed alarm activated COMMUNICATION/EDUCATION:  
The patients plan of care was discussed with: Physical Therapist and Registered Nurse. [x] Home safety education was provided and the patient/caregiver indicated understanding. [x] Patient/family have participated as able in goal setting and plan of care. [x] Patient/family agree to work toward stated goals and plan of care. [] Patient understands intent and goals of therapy, but is neutral about his/her participation. [] Patient is unable to participate in goal setting and plan of care. This patients plan of care is appropriate for delegation to [facility]. Thank you for this referral. 
Cecil Smith, OT Time Calculation: 21 mins

## 2018-12-06 NOTE — PROGRESS NOTES
Problem: Pressure Injury - Risk of 
Goal: *Prevention of pressure injury Document Sunil Scale and appropriate interventions in the flowsheet. Outcome: Progressing Towards Goal 
Pressure Injury Interventions: 
Sensory Interventions: Assess changes in LOC, Check visual cues for pain, Minimize linen layers Moisture Interventions: Maintain skin hydration (lotion/cream), Minimize layers, Absorbent underpads Activity Interventions: Pressure redistribution bed/mattress(bed type) Mobility Interventions: Assess need for specialty bed, Float heels, HOB 30 degrees or less, Turn and reposition approx. every two hours(pillow and wedges) Nutrition Interventions: Document food/fluid/supplement intake Friction and Shear Interventions: Lift sheet, Minimize layers

## 2018-12-06 NOTE — PROGRESS NOTES
0800 Bedside shift change report given to Jose Barriga (oncoming nurse) by Chas Snow (offgoing nurse). Report included the following information SBAR, MAR and Cardiac Rhythm NSR.  
 
0830 Pt bladder scanned 211ml will continue to monitor. Pt tolerated clear liquid diet well will advance to  
 
1010 R Arterial line discontinued per NP [place]. Tolerated well.

## 2018-12-06 NOTE — OP NOTES
[address] [place] [address] REPORT    Jakub Hunt  MR#: [id number]  : 1945  ACCOUNT #: [de-identified]   DATE OF SERVICE: 2018    PROCEDURE PERFORMED:  Repair of the mitral valve with severe mitral regurgitation with MitraClip procedure. INDICATION:  Severe mitral regurgitation, symptomatic in a patient of prohibitive surgical risk. PREOPERATIVE DIAGNOSIS:  Severe mitral regurgitation. POSTOPERATIVE DIAGNOSIS:  Severe mitral regurgitation. ESTIMATED BLOOD LOSS:  10 mL. SPECIMENS REMOVED:  None. PRIMARY OPERATORS:  Mendy Jiménez MD and Bro Brown MD    ASSISTANT:  Traci Kwon MD    SURGEON:  Beto Villeda MD    ANESTHESIA:  general.    COMPLICATIONS:  none. IMPLANTS:  2 mitraclips. PROCEDURE IN DETAIL:  The patient was brought to the hybrid cath lab in fasting state. After informed consent, the patient was prepped and draped in the usual manner. The patient was sedated by anesthesia services. A transesophageal echocardiogram was performed preprocedure and throughout the procedure by anesthesia services. A 6-Kyrgyz sheath was then placed in right femoral vein and a [place] catheter was placed through the sheath into the right heart for preoperative right heart catheterization. Over a wire, the 6-Kyrgyz sheath was removed and transseptal sheath was placed under fluoroscopic guidance and VIGNESH guidance at the atrial septum to appropriate position and successful transseptal puncture was performed. Following transseptal puncture, heparin was given. Following this, a 24-Kyrgyz steerable guide was advanced over a wire into the left atrium. An XT MitraClip was advanced through the guide and carefully positioned at the A2 P2 position of the mitral valve. The clip was successfully deployed. There was residual mitral regurgitation bilaterally.   For this reason, a second clip, NT clip, was advanced through the guide and positioned just lateral to the first clip and successfully deployed. Repeat transesophageal echocardiogram revealed marked improvement with only mild regurgitation present. The patient was otherwise stable. The delivery system was removed. The sheath was withdrawn back to the right atrium. Repeat VIGNESH did not reveal any significant ASD and no right to left shunting. At this point, the sheath was removed and hemostasis was obtained with a subcutaneous stitch. The patient was transferred to the recovery room in stable condition. CONCLUSION:  Successful repair of severe mitral regurgitation in a patient of prohibitive surgical risks with 2 MitraClips.       MD CAMERON Jolley / KAI  D: 12/05/2018 19:36     T: 12/06/2018 07:28  JOB #: [id number]

## 2018-12-06 NOTE — PROGRESS NOTES
[facility] ICU Progress Note Admit Date: 2018 POD:  1 Day Post-Op Procedure:  Procedure(s): 
TRANSCATHETER MITRAL VALVE REPAIR, MITRAL CLIP Subjective:  
Pt seen with Dr. Magalie Wilkins. On Kris 10. Afebrile, on RA. Objective:  
Vitals: 
Blood pressure 121/71, pulse 83, temperature 98.4 °F (36.9 °C), resp. rate 19, height 5' 1\" (1.549 m), weight 198 lb 6.6 oz (90 kg), SpO2 98 %. Temp (24hrs), Av.9 °F (36.6 °C), Min:97.1 °F (36.2 °C), Max:98.6 °F (37 °C) EKG/Rhythm:  SR Oxygen Therapy: 
Oxygen Therapy O2 Sat (%): 98 % (18) Pulse via Oximetry: 83 beats per minute (18) O2 Device: Room air (18) O2 Flow Rate (L/min): 3 l/min (18) CXR:   
CXR Results  (Last 48 hours) 18 0422  XR CHEST PORT Final result Impression:  IMPRESSION:  
   
Interval subtle regression of interstitial edema. Probable trace left pleural effusion. Narrative:  INDICATION: Postop heart. EXAM:  
   
Portable AP chest radiograph was obtained at 0356 hours on 2018. FINDINGS:  
   
Comparison studies:  2018. The heart is borderline  in size. The lungs are moderately well expanded bilaterally. The visualized bones are grossly within normal limits. Central line tip over superior vena cava. Slight blunting of left costophrenic  
angle. Interval subtle regression of interstitial edema. 18 1340  XR CHEST PORT Final result Impression:  IMPRESSION: Appropriate central line placement. Mild patchy airspace disease  
left lower lobe. Narrative:  EXAM:  XR CHEST PORT INDICATION:  Postop heart COMPARISON:   FINDINGS: A portable AP radiograph of the chest was obtained at 1332 hours. A  
right jugular line tip is in the region of the superior vena cava. The patient  
is on a cardiac monitor.   There is minimal patchy airspace disease in the left  
lower lobe. There is atherosclerosis of the aorta. The bones and soft tissues  
are grossly within normal limits. Admission Weight: Last Weight Weight: 200 lb 6 oz (90.9 kg) Weight: 198 lb 6.6 oz (90 kg) Intake / Output / Drain: 
Current Shift: No intake/output data recorded. Last 24 hrs.:  
 
Intake/Output Summary (Last 24 hours) at 2018 4258 Last data filed at 2018 0700 Gross per 24 hour Intake 1587.48 ml Output 1650 ml Net -62.52 ml EXAM: 
General:   No obvious distress. Lungs:   Clear to auscultation bilat upper, diminished in bases. Incision:  Groin Drs CDI Heart:  Regular rate and rhythm, S1, S2 normal, soft systolic murmur, No click, rub or gallop. Abdomen:   Soft, non-tender. Bowel sounds normal. No masses,  No organomegaly. Extremities:  No edema. PPP. Neurologic:  Gross motor and sensory apparatus intact. Labs:  
Recent Labs 18 
0346 18 
1333 WBC 4.7 4.5 HGB 8.5* 9.3* HCT 27.6* 28.9*  
 199  144  
K 4.4 3.6 BUN 16 16 CREA 1.29* 1.17* GLU 86 98 INR  --  1.1 Assessment:  
 
Active Problems: 
  Non-rheumatic mitral regurgitation (2018) Mitral regurgitation (2018) Plan/Recommendations/Medical Decision Makin. s/p TMVr:   No asa, as pt is on plavix/eliquis--resume both. 2. Afib (3/2016) - Resume eliquis, amio. Hold BB until BP can tolerate. 3. CAD s/p STEMI & MARTHA to Diag (2018):  Resume plavix, statin. No BB until appropriate. No asa d/t plavix/eliquis. 4. HTN -  Hold BB Until appropriate. 5. Atelectasis w/ hx of Asthma - cont steroid nebs, PRN albuterol nebs. On RA. IS and activity as tolerated. 6. Hypothyroid - recheck thyroid panel. TSH low 0.06. Reduce synthroid to 100 mcg. Need recheck labs as outpt. 7. Obesity- dietary counseling given. 8. OA - resume Keppra for neuropathic pain?  Uses rollator 9. Urinary incontinence, recurrent UTIs-  resume oxybutynin. 10. Depression - no current Rx 11. Renal insufficiency: stable. Monitor Cr 
12. Postop anemia s/t acute blood loss:  Monitor H/H. Start PO iron/Vit C.   
13. Dispo:  PT/OT. Deline, transfer to [facility]. Update: Had discussion w/ pt & son. Pt plans on returning to [place] on sun 12/9 w/ son and will return for 30 day FU. Discussed importance of making FU appt within 1 week of returning to [facility] w/ established PCP since won't have any Home health services and out of [place]. Card for [facility] NP given to son, asked that PCP call [facility] with any concerns/questions. Discussed w/ Corbin Castleman, NP and she is in agreement with this plan.    
 
Signed By: Dante Sanchez NP

## 2018-12-06 NOTE — PROGRESS NOTES
Reason for Admission:  S/p TMVR 
                
RRAT Score:    5 Plan for utilizing home health:      Order noted for home health SN and patient has declined services at this time Likelihood of Readmission:  low Transition of Care Plan:      Home with cardiac surgery follow-up Met with patient and her sister Anna Eli (who lives in [place]) at bedside- pateint lives alone - her son lives in [place] and is heading into town today and will be here until Sunday - patient also has a daughter Ramsey Collazo ph# [phone]) who lives near her -   Patient or her daughter usually drives her to MD appointments- uses [facility] in [place] for short term meds and [facility] order for long term meds- family will transport to home upon discharge.  ANDREW Olvera

## 2018-12-06 NOTE — PROGRESS NOTES
Bedside shift change report given to Jeane Child (oncoming nurse) by Lyudmila Tong RN (offgoing nurse). Report included the following information SBAR, Kardex, Intake/Output, MAR and Recent Results.

## 2018-12-06 NOTE — PROGRESS NOTES
Problem: Mobility Impaired (Adult and Pediatric) Goal: *Acute Goals and Plan of Care (Insert Text) Physical Therapy Goals Initiated 12/6/2018 1. Patient will move from supine to sit and sit to supine , scoot up and down and roll side to side in bed with modified independence within 7 day(s). 2.  Patient will transfer from bed to chair and chair to bed with modified independence using the least restrictive device within 7 day(s). 3.  Patient will perform sit to stand with supervision/set-up within 7 day(s). 4.  Patient will ambulate with supervision/set-up for 50 feet with the least restrictive device within 7 day(s). physical Therapy EVALUATION Patient: Sanjana Fontaine (39 y.o. female) Date: 12/6/2018 Primary Diagnosis: MITRAL REGURGITATION Mitral regurgitation Procedure(s) (LRB): 
TRANSCATHETER MITRAL VALVE REPAIR, MITRAL CLIP (N/A) 1 Day Post-Op Precautions:     
 
ASSESSMENT : 
Based on the objective data described below, the patient presents with decreased activity tolerance, pain, SANDERS, decreased balance, and unsteady gait s/p transcatheter mitral valve repair POD1. Chart reviewed, RN approved, and patient received sitting up in a chair agreeable to work with therapy. Transferred sit<>stand with CGA. Performed stand pivot transfer chair to toilet with CGA but was unable to void. Patient ambulated ~40ft with Devan and HHA x1. During ambulation, patient demonstrated antalgic gait (R hip pain), wide KATHARINE, and decreased step clearance. Patient returned to chair at end of session and left with all needs met. VSS throughout session (sitting post activity, /40, H 86, O2 97%. HR elevated to 109 with ambulation). Patient lives alone in a 1 story home with ramp to enter. Patient uses a cane or rollator with community ambulation. Recommending  PT upon discharge to maximize safety and independence with functional mobility. Patient will benefit from skilled intervention to address the above impairments. Patients rehabilitation potential is considered to be Good Factors which may influence rehabilitation potential include:  
[x]         None noted 
[]         Mental ability/status []         Medical condition 
[]         Home/family situation and support systems 
[]         Safety awareness 
[]         Pain tolerance/management 
[]         Other: PLAN : 
Recommendations and Planned Interventions: 
[x]           Bed Mobility Training             []    Neuromuscular Re-Education 
[x]           Transfer Training                   []    Orthotic/Prosthetic Training 
[x]           Gait Training                         []    Modalities [x]           Therapeutic Exercises           []    Edema Management/Control 
[x]           Therapeutic Activities            [x]    Patient and Family Training/Education 
[]           Other (comment): Frequency/Duration: Patient will be followed by physical therapy  5 times a week to address goals. Discharge Recommendations: Home Health Further Equipment Recommendations for Discharge: None SUBJECTIVE:  
Patient stated Usually the pain is in my left hip, not the right .  OBJECTIVE DATA SUMMARY:  
HISTORY:   
Past Medical History:  
Diagnosis Date  Arrhythmia \"SKIPS A BEAT\"  Arthritis  Asthma  Chronic pain BACK  Diabetes ([facility] [address] BORDERLINE  Endocrine disease   
 thyroid  Hypertension  Psychiatric disorder   
 depression  Seizures ([facility] [address] \"I'M TAKING MEDICINE FOR SEIZURES, THEY SAY I HAD ONE, ONE TIME\"  Thromboembolus ([facility] [address] LEFT DVT  Thyroid disease Past Surgical History:  
Procedure Laterality Date  HX APPENDECTOMY  HX CHOLECYSTECTOMY  HX ORTHOPAEDIC    
 LEFT TKR  HX OTHER SURGICAL SKIN GRAFT ON LEFT LEG  
 HX SON AND BSO Prior Level of Function/Home Situation: Patient lives alone in 1 story home with ramp to enter. She is independent with ADLs, IADLs, and drives. She is Monserrat ambulating in the community [facility] [place]. Personal factors and/or comorbidities impacting plan of care: PMH Home Situation Home Environment: Private residence # Steps to Enter: 0 Wheelchair Ramp: Yes One/Two Story Residence: One story Living Alone: Yes Support Systems: Child(kasandra) Patient Expects to be Discharged to[de-identified] Private residence Current DME Used/Available at Home: [employer], rollator, [employer], straight Tub or Shower Type: Shower EXAMINATION/PRESENTATION/DECISION MAKING: Critical Behavior: 
Neurologic State: Alert Orientation Level: Oriented X4 Cognition: Appropriate decision making, Appropriate for age attention/concentration, Appropriate safety awareness, Follows commands Safety/Judgement: Awareness of environment, Insight into deficits Hearing: Auditory Auditory Impairment: None Hearing Aids/Status: (NA)Skin:   
Edema:  
Range Of Motion: 
AROM: Within functional limits Strength:   
Strength: Within functional limits Tone & Sensation:  
Tone: Normal 
  
  
  
  
Sensation: Intact Coordination: 
Coordination: Within functional limits Vision:  
Acuity: Within Defined Limits Functional Mobility: 
Bed Mobility: 
  
Supine to Sit: (Received sitting in chair) Transfers: 
Sit to Stand: Contact guard assistance Stand to Sit: Contact guard assistance Stand Pivot Transfers: Contact guard assistance Balance:  
Sitting: Intact Standing: Impaired Standing - Static: Fair;Constant support Standing - Dynamic : FairAmbulation/Gait Training:Distance (ft): 40 Feet (ft) Assistive Device: Gait belt(HHA x1) Ambulation - Level of Assistance: Minimal assistance;Assist x1 Gait Abnormalities: Antalgic;Decreased step clearance;Shuffling gait;Trunk sway increased Base of Support: Widened Stance: Right decreased Speed/Emilia: Shuffled; Slow Step Length: Left shortened;Right shortened Stairs: Therapeutic Exercises:  
 
 
Functional Measure: 
Barthel Index: 
 
Bathin Bladder: 10 Bowels: 10 
Groomin Dressin Feeding: 10 Mobility: 0 Stairs: 0 Toilet Use: 5 Transfer (Bed to Chair and Back): 10 Total: 55 Barthel and G-code impairment scale: 
Percentage of impairment CH 
0% CI 
1-19% CJ 
20-39% CK 
40-59% CL 
60-79% CM 
80-99% CN 
100% Barthel Score 0-100 100 99-80 79-60 59-40 20-39 1-19 
 0 Barthel Score 0-20 20 17-19 13-16 9-12 5-8 1-4 0 The Barthel ADL Index: Guidelines 1. The index should be used as a record of what a patient does, not as a record of what a patient could do. 2. The main aim is to establish degree of independence from any help, physical or verbal, however minor and for whatever reason. 3. The need for supervision renders the patient not independent. 4. A patient's performance should be established using the best available evidence. Asking the patient, friends/relatives and nurses are the usual sources, but direct observation and common sense are also important. However direct testing is not needed. 5. Usually the patient's performance over the preceding 24-48 hours is important, but occasionally longer periods will be relevant. 6. Middle categories imply that the patient supplies over 50 per cent of the effort. 7. Use of aids to be independent is allowed. Kristi Boas., Barthel, D.W. (8341). Functional evaluation: the Barthel Index. [facility] (14)2. KYLE Meza, Sanjana Briggs., [facility], [address] [place] [address] (). Measuring the change indisability after inpatient rehabilitation; comparison of the responsiveness of the Barthel Index and Functional [place] Measure. Journal of Neurology, Neurosurgery, and Psychiatry, 66(4), 672-503.  
LAKESHIA Bello, JOSE Puentes, Alverna Rinne, MDaltonA. (2004.) Assessment of post-stroke quality of life in cost-effectiveness studies: The usefulness of the Barthel Index and the EuroQoL-5D. [facility], 13, 813-06 G codes: In compliance with CMSs Claims Based Outcome Reporting, the following G-code set was chosen for this patient based on their primary functional limitation being treated: The outcome measure chosen to determine the severity of the functional limitation was the Barthel with a score of 55/100 which was correlated with the impairment scale. ? Mobility - Walking and Moving Around:  
  - CURRENT STATUS: CK - 40%-59% impaired, limited or restricted  - GOAL STATUS: CJ - 20%-39% impaired, limited or restricted  - D/C STATUS:  ---------------To be determined--------------- Physical Therapy Evaluation Charge Determination History Examination Presentation Decision-Making HIGH Complexity :3+ comorbidities / personal factors will impact the outcome/ POC  HIGH Complexity : 4+ Standardized tests and measures addressing body structure, function, activity limitation and / or participation in recreation  LOW Complexity : Stable, uncomplicated  LOW Complexity : FOTO score of  Based on the above components, the patient evaluation is determined to be of the following complexity level: LOW Pain: 
Pain Scale 1: Numeric (0 - 10) Pain Intensity 1: 0 Activity Tolerance:  
/40, HR , O2 sats 97% Please refer to the flowsheet for vital signs taken during this treatment. After treatment:  
[x]         Patient left in no apparent distress sitting up in chair 
[]         Patient left in no apparent distress in bed 
[x]         Call bell left within reach [x]         Nursing notified 
[]         Caregiver present 
[]         Bed alarm activated COMMUNICATION/EDUCATION:  
The patients plan of care was discussed with: Occupational Therapist and Registered Nurse. [x]         Fall prevention education was provided and the patient/caregiver indicated understanding. [x]         Patient/family have participated as able in goal setting and plan of care. [x]         Patient/family agree to work toward stated goals and plan of care. []         Patient understands intent and goals of therapy, but is neutral about his/her participation. []         Patient is unable to participate in goal setting and plan of care. Thank you for this referral. 
Lucia Mcintosh, PT, DPT Time Calculation: 20 mins

## 2018-12-06 NOTE — CARDIO/PULMONARY
Cardiac Rehab: Met with Marcus Stanton. Discussed cardiac rehab and enrollment. She lives close to [place]. Will put [facility]. cardiac rehab contact information on the AVS. Reviewed purpose of IS. She demonstrated proper IS use and was able to pull about 1000 ml. Marcus Stanton verbalized understanding with questions answered. Will continue to follow.  Angelita Hernandez RN

## 2018-12-07 ENCOUNTER — APPOINTMENT (OUTPATIENT)
Dept: GENERAL RADIOLOGY | Age: 73
DRG: 229 | End: 2018-12-07
Attending: NURSE PRACTITIONER
Payer: MEDICARE

## 2018-12-07 VITALS
BODY MASS INDEX: 37.54 KG/M2 | WEIGHT: 198.85 LBS | DIASTOLIC BLOOD PRESSURE: 79 MMHG | HEIGHT: 61 IN | HEART RATE: 66 BPM | RESPIRATION RATE: 16 BRPM | OXYGEN SATURATION: 100 % | SYSTOLIC BLOOD PRESSURE: 146 MMHG | TEMPERATURE: 97.8 F

## 2018-12-07 LAB
ALBUMIN SERPL-MCNC: 3.1 G/DL (ref 3.5–5)
ALBUMIN/GLOB SERPL: 1.2 {RATIO} (ref 1.1–2.2)
ALP SERPL-CCNC: 38 U/L (ref 45–117)
ALT SERPL-CCNC: 7 U/L (ref 12–78)
ANION GAP SERPL CALC-SCNC: 7 MMOL/L (ref 5–15)
AST SERPL-CCNC: 14 U/L (ref 15–37)
BILIRUB SERPL-MCNC: 0.4 MG/DL (ref 0.2–1)
BUN SERPL-MCNC: 16 MG/DL (ref 6–20)
BUN/CREAT SERPL: 13 (ref 12–20)
CALCIUM SERPL-MCNC: 8.2 MG/DL (ref 8.5–10.1)
CHLORIDE SERPL-SCNC: 108 MMOL/L (ref 97–108)
CO2 SERPL-SCNC: 25 MMOL/L (ref 21–32)
CREAT SERPL-MCNC: 1.26 MG/DL (ref 0.55–1.02)
ERYTHROCYTE [DISTWIDTH] IN BLOOD BY AUTOMATED COUNT: 13.9 % (ref 11.5–14.5)
GLOBULIN SER CALC-MCNC: 2.6 G/DL (ref 2–4)
GLUCOSE SERPL-MCNC: 84 MG/DL (ref 65–100)
HCT VFR BLD AUTO: 25.4 % (ref 35–47)
HGB BLD-MCNC: 8.1 G/DL (ref 11.5–16)
MAGNESIUM SERPL-MCNC: 2.1 MG/DL (ref 1.6–2.4)
MCH RBC QN AUTO: 30.7 PG (ref 26–34)
MCHC RBC AUTO-ENTMCNC: 31.9 G/DL (ref 30–36.5)
MCV RBC AUTO: 96.2 FL (ref 80–99)
NRBC # BLD: 0 K/UL (ref 0–0.01)
NRBC BLD-RTO: 0 PER 100 WBC
PLATELET # BLD AUTO: 167 K/UL (ref 150–400)
PMV BLD AUTO: 10.1 FL (ref 8.9–12.9)
POTASSIUM SERPL-SCNC: 4.2 MMOL/L (ref 3.5–5.1)
PROT SERPL-MCNC: 5.7 G/DL (ref 6.4–8.2)
RBC # BLD AUTO: 2.64 M/UL (ref 3.8–5.2)
SODIUM SERPL-SCNC: 140 MMOL/L (ref 136–145)
WBC # BLD AUTO: 3.2 K/UL (ref 3.6–11)

## 2018-12-07 PROCEDURE — 85027 COMPLETE CBC AUTOMATED: CPT

## 2018-12-07 PROCEDURE — 97535 SELF CARE MNGMENT TRAINING: CPT

## 2018-12-07 PROCEDURE — 97116 GAIT TRAINING THERAPY: CPT

## 2018-12-07 PROCEDURE — 74011250637 HC RX REV CODE- 250/637: Performed by: PHYSICIAN ASSISTANT

## 2018-12-07 PROCEDURE — 71045 X-RAY EXAM CHEST 1 VIEW: CPT

## 2018-12-07 PROCEDURE — 83735 ASSAY OF MAGNESIUM: CPT

## 2018-12-07 PROCEDURE — G8989 SELF CARE D/C STATUS: HCPCS

## 2018-12-07 PROCEDURE — 74011250637 HC RX REV CODE- 250/637: Performed by: NURSE PRACTITIONER

## 2018-12-07 PROCEDURE — 36415 COLL VENOUS BLD VENIPUNCTURE: CPT

## 2018-12-07 PROCEDURE — 80053 COMPREHEN METABOLIC PANEL: CPT

## 2018-12-07 PROCEDURE — 74011250637 HC RX REV CODE- 250/637: Performed by: THORACIC SURGERY (CARDIOTHORACIC VASCULAR SURGERY)

## 2018-12-07 RX ORDER — ACETAMINOPHEN 325 MG/1
650 TABLET ORAL
Qty: 30 TAB | Refills: 0 | Status: SHIPPED
Start: 2018-12-07

## 2018-12-07 RX ORDER — ASCORBIC ACID 500 MG
1000 TABLET ORAL DAILY
Status: DISCONTINUED | OUTPATIENT
Start: 2018-12-07 | End: 2018-12-07 | Stop reason: HOSPADM

## 2018-12-07 RX ORDER — LEVOTHYROXINE SODIUM 100 UG/1
137 TABLET ORAL
Qty: 45 TAB | Refills: 1 | Status: SHIPPED | OUTPATIENT
Start: 2018-12-07 | End: 2018-12-07 | Stop reason: SDUPTHER

## 2018-12-07 RX ORDER — AMLODIPINE BESYLATE 5 MG/1
5 TABLET ORAL DAILY
Qty: 30 TAB | Refills: 1 | Status: SHIPPED | OUTPATIENT
Start: 2018-12-07 | End: 2019-02-05

## 2018-12-07 RX ORDER — AMOXICILLIN 250 MG
1 CAPSULE ORAL 2 TIMES DAILY
Qty: 60 TAB | Refills: 0 | Status: SHIPPED | OUTPATIENT
Start: 2018-12-07 | End: 2019-01-06

## 2018-12-07 RX ORDER — LANOLIN ALCOHOL/MO/W.PET/CERES
1 CREAM (GRAM) TOPICAL
Status: DISCONTINUED | OUTPATIENT
Start: 2018-12-07 | End: 2018-12-07 | Stop reason: HOSPADM

## 2018-12-07 RX ORDER — BACITRACIN 500 UNIT/G
PACKET (EA) TOPICAL
Status: DISCONTINUED
Start: 2018-12-07 | End: 2018-12-07 | Stop reason: HOSPADM

## 2018-12-07 RX ORDER — METOPROLOL TARTRATE 25 MG/1
25 TABLET, FILM COATED ORAL 2 TIMES DAILY
Status: DISCONTINUED | OUTPATIENT
Start: 2018-12-07 | End: 2018-12-07 | Stop reason: HOSPADM

## 2018-12-07 RX ORDER — AMLODIPINE BESYLATE 5 MG/1
5 TABLET ORAL DAILY
Status: DISCONTINUED | OUTPATIENT
Start: 2018-12-07 | End: 2018-12-07 | Stop reason: HOSPADM

## 2018-12-07 RX ORDER — LEVOTHYROXINE SODIUM 100 UG/1
100 TABLET ORAL
Qty: 30 TAB | Refills: 1 | Status: SHIPPED | OUTPATIENT
Start: 2018-12-07 | End: 2019-02-05

## 2018-12-07 RX ORDER — VITAMIN E 1000 UNIT
1000 CAPSULE ORAL DAILY
Qty: 30 TAB | Refills: 0 | Status: SHIPPED | OUTPATIENT
Start: 2018-12-07 | End: 2019-01-06

## 2018-12-07 RX ORDER — LANOLIN ALCOHOL/MO/W.PET/CERES
325 CREAM (GRAM) TOPICAL
Qty: 30 TAB | Refills: 0 | Status: SHIPPED | OUTPATIENT
Start: 2018-12-07 | End: 2019-01-06

## 2018-12-07 RX ADMIN — OXYCODONE HYDROCHLORIDE AND ACETAMINOPHEN 1000 MG: 500 TABLET ORAL at 11:43

## 2018-12-07 RX ADMIN — LEVOTHYROXINE SODIUM 100 MCG: 100 TABLET ORAL at 07:18

## 2018-12-07 RX ADMIN — Medication 10 ML: at 05:26

## 2018-12-07 RX ADMIN — APIXABAN 5 MG: 5 TABLET, FILM COATED ORAL at 08:19

## 2018-12-07 RX ADMIN — OXYBUTYNIN CHLORIDE 5 MG: 5 TABLET ORAL at 08:19

## 2018-12-07 RX ADMIN — AMLODIPINE BESYLATE 5 MG: 5 TABLET ORAL at 11:43

## 2018-12-07 RX ADMIN — CETIRIZINE HYDROCHLORIDE 10 MG: 10 TABLET, FILM COATED ORAL at 08:19

## 2018-12-07 RX ADMIN — CLOPIDOGREL BISULFATE 75 MG: 75 TABLET, FILM COATED ORAL at 08:19

## 2018-12-07 RX ADMIN — LEVETIRACETAM 500 MG: 500 TABLET ORAL at 08:20

## 2018-12-07 RX ADMIN — DIPHENHYDRAMINE HYDROCHLORIDE 25 MG: 25 CAPSULE ORAL at 05:26

## 2018-12-07 RX ADMIN — METOPROLOL TARTRATE 25 MG: 25 TABLET ORAL at 08:19

## 2018-12-07 RX ADMIN — SENNOSIDES AND DOCUSATE SODIUM 1 TABLET: 8.6; 5 TABLET ORAL at 08:20

## 2018-12-07 RX ADMIN — FERROUS SULFATE TAB 325 MG (65 MG ELEMENTAL FE) 325 MG: 325 (65 FE) TAB at 11:43

## 2018-12-07 RX ADMIN — AMIODARONE HYDROCHLORIDE 200 MG: 200 TABLET ORAL at 08:19

## 2018-12-07 RX ADMIN — FAMOTIDINE 20 MG: 20 TABLET ORAL at 08:19

## 2018-12-07 RX ADMIN — Medication 400 MG: at 08:19

## 2018-12-07 NOTE — PROGRESS NOTES
1530:  Bedside shift change report given to Shirley Garcia (oncoming nurse) by Maris Orona (offgoing nurse). Report included the following information SBAR, Kardex, MAR and Recent Results. 1930:  Bedside shift change report given to Radha Cao (oncoming nurse) by Shirley Garcia (offgoing nurse). Report included the following information SBAR, Kardex, MAR and Recent Results.

## 2018-12-07 NOTE — DISCHARGE SUMMARY
hospitals Discharge Summary     Patient ID:  Carlos Gonzales  [id number]  12 y.o.  1945    Admit date: 12/5/2018    Discharge date: 12/7/2018     Admitting Physician: Estela Kelley MD     Referring Cardiologist:  Brenna Real MD    PCP:  Humza Palacios MD    Admitting Diagnoses:   1. MR  Discharge Diagnoses:     Hospital Problems  Date Reviewed: 12/5/2018          Codes Class Noted POA    Mitral regurgitation ICD-10-CM: I34.0  ICD-9-CM: 424.0  12/5/2018 Unknown        Non-rheumatic mitral regurgitation ICD-10-CM: I34.0  ICD-9-CM: 424.0  11/16/2018 Yes            Discharged Condition: improved    Disposition: home, see patient instructions for treatment and plan    Procedures for this admission:  Procedure(s):  TRANSCATHETER MITRAL VALVE REPAIR, MITRAL CLIP    Discharge Medications:      My Medications      START taking these medications      Instructions Each Dose to Equal Morning Noon Evening Bedtime   acetaminophen 325 mg tablet  Commonly known as:  TYLENOL    Your last dose was: Your next dose is: Take 2 Tabs by mouth every four (4) hours as needed. May purchase over the counter   650 mg                 amLODIPine 5 mg tablet  Commonly known as:  NORVASC    Your last dose was: Your next dose is: Take 1 Tab by mouth daily for 60 days. 5 mg                 ascorbic acid (vitamin C) 1,000 mg tablet  Commonly known as:  VITAMIN C    Your last dose was: Your next dose is: Take 1 Tab by mouth daily for 30 days. 1000 mg                 ferrous sulfate 325 mg (65 mg iron) tablet    Your last dose was: Your next dose is: Take 1 Tab by mouth daily (with breakfast) for 30 days. 325 mg                 senna-docusate 8.6-50 mg per tablet  Commonly known as:  PERICOLACE    Your last dose was: Your next dose is: Take 1 Tab by mouth two (2) times a day for 30 days.    1 Tab                    CHANGE how you take these medications      Instructions Each Dose to Equal Morning Noon Evening Bedtime   levothyroxine 100 mcg tablet  Commonly known as:  SYNTHROID  What changed:    · medication strength  · how much to take    Your last dose was: Your next dose is: Take 1 Tab by mouth Daily (before breakfast) for 60 days. 100 mcg                    CONTINUE taking these medications      Instructions Each Dose to Equal Morning Noon Evening Bedtime   * albuterol 90 mcg/actuation inhaler  Commonly known as:  PROVENTIL HFA, VENTOLIN HFA, PROAIR HFA    Your last dose was: Your next dose is: Take 2 Puffs by inhalation every four (4) hours as needed for Wheezing. 2 Puff                 * albuterol sulfate 2.5 mg/0.5 mL Nebu nebulizer solution  Commonly known as:  PROVENTIL;VENTOLIN    Your last dose was: Your next dose is:          by Nebulization route every six (6) hours as needed for Wheezing. amiodarone 200 mg tablet  Commonly known as:  CORDARONE    Your last dose was: Your next dose is: Take 200 mg by mouth daily. 200 mg                 apixaban 5 mg tablet  Commonly known as:  ELIQUIS    Your last dose was: Your next dose is: Take 5 mg by mouth two (2) times a day. 5 mg                 atorvastatin 40 mg tablet  Commonly known as:  LIPITOR    Your last dose was: Your next dose is: Take 40 mg by mouth daily. 40 mg                 cetirizine 10 mg tablet  Commonly known as:  ZYRTEC    Your last dose was: Your next dose is: Take 10 mg by mouth daily. 10 mg                 cholecalciferol 1,000 unit tablet  Commonly known as:  VITAMIN D3    Your last dose was: Your next dose is: Take 1,000 Units by mouth daily. 1000 Units                 clopidogrel 75 mg Tab  Commonly known as:  PLAVIX    Your last dose was: Your next dose is: Take 75 mg by mouth daily.    75 mg                 cranberry 405 mg Cap    Your last dose was: Your next dose is: Take  by mouth daily. diphenhydrAMINE 25 mg tablet  Commonly known as:  BENADRYL    Your last dose was: Your next dose is: Take 25 mg by mouth three (3) times daily as needed for Itching. 25 mg                 guaiFENesin-codeine 100-10 mg/5 mL solution  Commonly known as:  ROBITUSSIN AC    Your last dose was: Your next dose is: Take 5 mL by mouth three (3) times daily as needed for Cough. 5 mL                 levETIRAcetam 500 mg tablet  Commonly known as:  KEPPRA    Your last dose was: Your next dose is: Take 500 mg by mouth two (2) times a day. 500 mg                 metoprolol succinate 25 mg XL tablet  Commonly known as:  TOPROL-XL    Your last dose was: Your next dose is: Take 25 mg by mouth daily. 25 mg                 multivitamin tablet  Commonly known as:  ONE A DAY    Your last dose was: Your next dose is: Take 1 Tab by mouth daily. 1 Tab                 oxybutynin 5 mg tablet  Commonly known as:  DITROPAN    Your last dose was: Your next dose is: Take 5 mg by mouth two (2) times a day. 5 mg                 SYMBICORT 160-4.5 mcg/actuation Hfaa  Generic drug:  budesonide-formoterol    Your last dose was: Your next dose is: Take 2 Puffs by inhalation two (2) times a day. 2 Puff                 vitamin E 400 unit capsule  Commonly known as:  AQUA GEMS    Your last dose was: Your next dose is: Take 400 Units by mouth daily. 400 Units                     * This list has 2 medication(s) that are the same as other medications prescribed for you. Read the directions carefully, and ask your doctor or other care provider to review them with you.                Where to Get Your Medications      These medications were sent to [facility] [address] Phone:  [phone]   · amLODIPine 5 mg tablet  · ascorbic acid (vitamin C) 1,000 mg tablet  · ferrous sulfate 325 mg (65 mg iron) tablet  · levothyroxine 100 mcg tablet  · senna-docusate 8.6-50 mg per tablet     Information on where to get these meds will be given to you by the nurse or doctor. Ask your nurse or doctor about these medications  · acetaminophen 325 mg tablet       HPI:    [phone]. o.  female with PMHx of MR, Afib (3/2016), CAD s/p STEMI & MARTHA to Diag (9/2018), HTN, Asthma, Hypothyroid, Obesity, OA, Urinary incontinence, recurrent UTIs, Depression, PVD w/ chronic venous stasis ulcers, DVT, colon polyps, s/p terence, s/p SON/BSO, s/p  L TKR (2006), s/p R cataract removal (2007), past smoker (quit 1985) that is referred to the [address] [place] [address] by Dr. Maria Elena Camarena interventional evaluation of MR.     Ms. Meera Vergara has been followed by Dr. Lizandro Broussard for a bit over 3 yrs when her PCP initially referred her for Afib.  She was recently hospitalized for Afib w/ RVR and CHF.  During that hospitalization there is a report of rhythm deterioration to VT and Vfib requiring defibrillation.  She had subsequent PAF while hospitalized but was ultimately discharged in [facility] [place] after chemical cardioversion w/ amiodarone.      Ms. Meera Vergara has been home for approximately 4 weeks and reports having good and bad days. Catalina Failing mobility is limited by considerable back pain as well as SOB/SANDERS. Stiven Patel does not get symptomatic w/ ADLs but has many adaptive devices (shower chair) and habits (sits often between/for tasks).  She reports considerable fatigue but also states she is a 'poor sleeper.'  She has some chest tightness on occasion w/ activity that requires her to lie down for resolution.  She also has two pillow orthopnea, LE edema and occasional fluid blisters (though none currently) and wears compression socks.  She also c/o some lightheadedness when walking any extended distance in her home and doesn't use her rollator when inside. She denies any syncope or falls.  Ms. Ann Knight has an inhaler that she uses for 'emergencies' and has used it once since discharge.      She lives alone and drove herself to the office today. Deidre Yancey sets up her pill box on her own and is independent in all of her medical decision making and ADLs. Deidre Yancey has a daughter that lives a couple blocks away that visits nightly.  She remarks that she has 'trouble drinking all of the water she is supposed to.' She reports being instructed to drink at least 64 oz of water/day d/t a concern about her kidneys.  This is unclear in her records and she denies any other providers / specialists. Deidre Yancey denies any blood/blackness/tarriness in her stools.  She denies any hx of chest surgery/trauma/XRT.      Hospital Course: On 12/5/18 pt underwent TMVr performed by Ayse Castro and Rian. Please see operative report for full details. She was transferred to the ICU in stable condition on neosynephrine. She was extubated on 12/5/18 at 1312. POD#1: 1. s/p TMVr:   No asa, as pt is on plavix/eliquis--resume both. 2. Afib (3/2016) - Resume eliquis, amio. Hold BB until BP can tolerate. 3. CAD s/p STEMI & MARTHA to Diag (9/2018):  Resume plavix, statin. No BB until appropriate. No asa d/t plavix/eliquis. 4. HTN -  Hold BB Until appropriate. 5. Atelectasis w/ hx of Asthma - cont steroid nebs, PRN albuterol nebs. On RA. IS and activity as tolerated. 6. Hypothyroid - recheck thyroid panel. TSH low 0.06. Reduce synthroid to 100 mcg. Need recheck labs as outpt. 7. Obesity- dietary counseling given. 8. OA - resume Keppra for neuropathic pain?  Uses rollator  9. Urinary incontinence, recurrent UTIs-  resume oxybutynin. 10. Depression - no current Rx  11. Renal insufficiency: stable. Monitor Cr  12. Postop anemia s/t acute blood loss:  Monitor H/H. Start PO iron/Vit C.    13. Dispo:  PT/OT. Deline, transfer to stepdown.      POD#2: 1. s/p TMVr: No asa, as pt is on plavix/eliquis--resume both. 2. Afib (3/2016) - Resumed eliquis, amio, BB  3. CAD s/p STEMI & MARTHA to Diag (9/2018): Resumed plavix, statin, BB. No asa d/t plavix/eliquis. 4. HTN -  increase metoprolol to 25 mg bid, add noravsc 5 mg  5. Atelectasis w/ hx of Asthma - cont steroid nebs, PRN albuterol nebs.  On RA.  IS and activity as tolerated. 6. Hypothyroid - recheck thyroid panel.  TSH low 0.06.  Reduced synthroid to 100 mcg.   Need recheck labs as outpt.    7. Obesity- dietary counseling given. 8. OA - resume Keppra for neuropathic pain?  Uses rollator  9. Urinary incontinence, recurrent UTIs-  resume oxybutynin. 10. Depression - no current Rx  11. Renal insufficiency: stable. Monitor Cr  12. Postop anemia s/t acute blood loss, chronic anemia:  Monitor H/H.  Start PO iron/Vit C.    13. Constipation: +flatus, no BM yet. Cont bowel regimen, no abd pain/N/V  14. Dispo:  PT/OT. R groin stitch removed, d/c today. Pt with plans to go home with son in [place] on Sunday, will f/u with PCP and return to our office for 1 month appt in January. Pt does not feel she needs home health to see her tomorrow     Referral to outpatient cardiac rehab made. Discharge Vital Signs:   Visit Vitals  /70 (BP 1 Location: Left arm, BP Patient Position: At rest)   Pulse 66   Temp 97.8 °F (36.6 °C)   Resp 16   Ht 5' 1\" (1.549 m)   Wt 198 lb 13.7 oz (90.2 kg)   SpO2 100%   BMI 37.57 kg/m²       Labs:   Recent Labs     12/07/18  0349  12/05/18  1333   WBC 3.2*   < > 4.5   HGB 8.1*   < > 9.3*   HCT 25.4*   < > 28.9*      < > 199      < > 144   K 4.2   < > 3.6   BUN 16   < > 16   CREA 1.26*   < > 1.17*   GLU 84   < > 98   INR  --   --  1.1    < > = values in this interval not displayed. Diagnostics:   Chest Port: IMPRESSION:     Resolved trace left pleural effusion. Clear lungs. Patient Instructions/Follow Up Care:  Discharge instructions were reviewed with the patient and family present. Questions were also answered at this time. Prescriptions and medications were reviewed. The patient has a follow up appointment with Dr. Rosa Maria Salazar on 1/7/19 at 12:30 pm. The patient was also instructed to follow up with her primary care physician as needed. The patient and family were encouraged to call with any questions or concerns.        Signed:  Riley Flores NP  12/7/2018  9:43 AM

## 2018-12-07 NOTE — PROGRESS NOTES
1945 Bedside and Verbal shift change report given to Mabel Piper (oncoming nurse) by Rosa Ren RN (offgoing nurse). Report included the following information SBAR, Kardex, Procedure Summary, MAR, Cardiac Rhythm NSR and Alarm Parameters .

## 2018-12-07 NOTE — OP NOTES
[address]   OPERATIVE REPORT     Mayte Lambert  MR#: [id number]  : 1945  ACCOUNT #: [de-identified]   DATE OF SERVICE: 2018     PROCEDURE PERFORMED:  Repair of the mitral valve with severe mitral regurgitation with MitraClip procedure.       INDICATION:  Severe mitral regurgitation, symptomatic in a patient of prohibitive surgical risk.     PREOPERATIVE DIAGNOSIS:  Severe mitral regurgitation.       POSTOPERATIVE DIAGNOSIS:  Severe mitral regurgitation.     ESTIMATED BLOOD LOSS:  10 mL.     SPECIMENS REMOVED:  None.     Surgeon :  Zarina García MD           Asistant SURGEON:  Sandra Cee MD     ANESTHESIA:  general.     COMPLICATIONS:  none.     IMPLANTS:  2 mitraclips.     PROCEDURE IN DETAIL:  The patient was brought to the hybrid cath lab in fasting state. After informed consent, the patient was prepped and draped in the usual manner. The patient was sedated by anesthesia services. A transesophageal echocardiogram was performed preprocedure and throughout the procedure by anesthesia services. A 6-South African sheath was then placed in right femoral vein and a Byrnedale-Iwona catheter was placed through the sheath into the right heart for preoperative right heart catheterization. Over a wire, the 6-South African sheath was removed and transseptal sheath was placed under fluoroscopic guidance and VIGNESH guidance at the atrial septum to appropriate position and successful transseptal puncture was performed. Following transseptal puncture, heparin was given. Following this, a 24-South African steerable guide was advanced over a wire into the left atrium. An XT MitraClip was advanced through the guide and carefully positioned at the A2 P2 position of the mitral valve. The clip was successfully deployed. There was residual mitral regurgitation bilaterally.   For this reason, a second clip, NT clip, was advanced through the guide and positioned just lateral to the first clip and successfully deployed. Repeat transesophageal echocardiogram revealed marked improvement with only mild regurgitation present. The patient was otherwise stable. The delivery system was removed. The sheath was withdrawn back to the right atrium. Repeat VIGNESH did not reveal any significant ASD and no right to left shunting. At this point, the sheath was removed and hemostasis was obtained with a subcutaneous stitch.   The patient was transferred to the recovery room in stable condition.     CONCLUSION:  Successful repair of severe mitral regurgitation in a patient of prohibitive surgical risks with 2 MitraClips.

## 2018-12-07 NOTE — DISCHARGE INSTRUCTIONS
[facility]    [address]  [address]                                                           [address] [place] [address] [place] [address], [address] [facility]  Office- [phone]  Fax- [phone]        Office- [phone]  Fax- [phone]  _____________________________________________________________  Dr. Sunny Gaitan, [address] GIANLUCA [address] Paul [address] ALLISON Mdoi__________________________________________________    Name:Vivi Azulina Holland     Surgery & Date: Procedure(s):  TRANSCATHETER MITRAL VALVE REPAIR, MITRAL CLIP    Discharge Date: 12/7/18    MEDICATIONS:  Please refer to your After Visit Summary for your medication list.   DO NOT TAKE ANY MEDICATIONS THAT ARE NOT ON THIS LIST    INSTRUCTIONS:  NO SMOKING OR TOBACCO PRODUCTS  Do not follow the activity/exercise instructions in your discharge book given to you as an inpatient. You have no activity restrictions. You may shower. Wash all incisions twice daily with mild soap and water. No lotions, ointments or powder. Call the office immediately for any redness, swelling, or drainage from your incision. Take your temperature daily and call for a temperature of 101 degrees or higher or for any symptoms that make you think you have and infection. Weigh yourself each morning. Call if you gain more than 5 pounds in 48 hours. Use the incentive spirometer 6-8 times a day-10 breaths each time. Walk several hundred feet several times daily. DIET  Eat an American Heart Association diet. If you are having trouble with your appetite, eat what you can. Try eating small, frequent meals throughout the day. ACTIVITY  1. You have no activity restrictions.  You may resume your daily activities at home, based on your comfort level. You may also drive. FOLLOW UP  1. Your first follow up appointment will be on 1/7/19 at 12:30 pm. Our office is located in [address] [place] [address] on the 4th floor, [address]. You will need to have an ECHO prior to your appointment time. Our office will set that up for you. Please call our office at [phone] if you are unable to make this appointment  2. You will be receiving a call before your 3 day follow up appointment to begin cardiac rehab. They are located in the [address] [place] [address] on [facility]. Their phone number is [phone]. Please call if you have not been contacted 2-3 weeks after discharge from the hospital.  3. We will make an appointment for you with your cardiologist in 4-5 weeks. 4. Consult you primary care physician regarding your influenza &   pneumovax vaccines. 5.   Please bring all medications with you to your appointment.     Signature:___________________________________________________

## 2018-12-07 NOTE — PROGRESS NOTES
Problem: Self Care Deficits Care Plan (Adult) Goal: *Acute Goals and Plan of Care (Insert Text) Occupational Therapy Goals Initiated 12/6/2018 1. Patient will perform grooming standing at sink without fatigue or LOB with supervision/set-up within 7 day(s). 2.  Patient will perform lower body dressing with supervision/set-up using AE PRN within 7 day(s). 3.  Patient will perform bathing with supervision/set-up within 7 day(s). 4.  Patient will perform toilet transfers with supervision/set-up within 7 day(s). 5.  Patient will perform all aspects of toileting with supervision/set-up within 7 day(s). 6.  Patient will participate in upper extremity therapeutic exercise/activities with supervision/set-up for 10 minutes within 7 day(s). 7.  Patient will utilize energy conservation techniques during functional activities with verbal cues within 7 day(s). Occupational Therapy TREATMENT/DISCHARGE Patient: Stephanie Watkins (77 y.o. female) Date: 12/7/2018 Diagnosis: MITRAL REGURGITATION Mitral regurgitation <principal problem not specified> Procedure(s) (LRB): 
TRANSCATHETER MITRAL VALVE REPAIR, MITRAL CLIP (N/A) 2 Days Post-Op Precautions:  fall Chart, occupational therapy assessment, plan of care, and goals were reviewed. ASSESSMENT: 
Patient demonstrates significant functional progress since yesterday and has achieved OT goals. Patient received supine in bed, alert and agreeable to therapy. Performed the following with SPV: supine<>sit, doffed/ donned socks raising each LE to EOB (performed easily but therapist introduced  AE if task becomes more difficult in the future), sit-stand, ambulated to/ from bathroom (initially provided HHA, then faded to [address]), toilet transfer, and grooming standing at sink.   Patient demonstrates impaired dynamic standing balance, wide KATHARINE, and antalgic gait, but reports her mobility is at baseline (furniture walking at home and using SPC/ rollator in community). This therapist encouraged patient to use SPC in home to improve mobility/ prevent falls. Patient's son will be staying at her home [facility], then patient will be staying with son in [place]. Recommend d/c home with no additional OT needs. Progression toward goals: 
[x]            Improving appropriately and progressing toward goals []            Improving slowly and progressing toward goals []            Not making progress toward goals and plan of care will be adjusted PLAN: 
Patient will be discharged from occupational therapy at this time. Rationale for discharge: 
[x]   Goals Achieved 
[]   [address] 
[]   Patient not participating in therapy 
[]   Other: 
Discharge Recommendations:  Home with family support, no additional OT needs Further Equipment Recommendations for Discharge:  none SUBJECTIVE:  
Patient stated I feel okay.  OBJECTIVE DATA SUMMARY:  
Cognitive/Behavioral Status: 
Neurologic State: Alert Orientation Level: Oriented X4 Cognition: Appropriate decision making; Appropriate for age attention/concentration; Appropriate safety awareness; Follows commands Perception: Appears intact Perseveration: No perseveration noted Safety/Judgement: Awareness of environment; Insight into deficits Functional Mobility and Transfers for ADLs:Bed Mobility: 
Supine to Sit: Supervision Sit to Supine: Supervision Scooting: Stand-by assistance Transfers: 
Sit to Stand: Supervision Functional Transfers Toilet Transfer : Supervision Balance: 
Sitting: Intact Standing: Impaired Standing - Static: Good Standing - Dynamic : Fair ADL Intervention: 
  
 
Grooming Washing Hands: Supervision/set-up(standing at sink) Lower Body Dressing Assistance Socks: Supervision/set-up(doffed/ donned raising each LE to EOB) Cognitive Retraining Safety/Judgement: Awareness of environment; Insight into deficits Pain: Pain Scale 1: Numeric (0 - 10) Pain Intensity 1: 0 Activity Tolerance: VSS After treatment:  
[] Patient left in no apparent distress sitting up in chair 
[x] Patient left in no apparent distress in bed 
[x] Call bell left within reach [x] Nursing notified 
[] Caregiver present 
[] Bed alarm activated COMMUNICATION/COLLABORATION:  
The patients plan of care was discussed with: Physical Therapist and Registered Nurse Loretha Bosworth, OT Time Calculation: 14 mins

## 2018-12-07 NOTE — PROGRESS NOTES
[facility] FLOOR Progress Note Admit Date: 2018 POD: 2 Days Post-Op Procedure:  Procedure(s): 
TRANSCATHETER MITRAL VALVE REPAIR, MITRAL CLIP Subjective:  
Pt seen with Dr. Lisa Wilson, Pt sitting up in bed, no complaints. Objective:  
 
Visit Vitals /70 (BP 1 Location: Left arm, BP Patient Position: At rest) Pulse 66 Temp 97.8 °F (36.6 °C) Resp 16 Ht 5' 1\" (1.549 m) Wt 198 lb 13.7 oz (90.2 kg) SpO2 100% BMI 37.57 kg/m² Temp (24hrs), Av °F (36.7 °C), Min:97.6 °F (36.4 °C), Max:98.5 °F (36.9 °C) Last 24hr Input/Output: 
 
Intake/Output Summary (Last 24 hours) at 2018 0932 Last data filed at 2018 7464 Gross per 24 hour Intake 1052 ml Output 2250 ml Net -1198 ml EKG/Rhythm: SR vs SB Oxygen: RA 
 
CXR:  
CXR Results  (Last 48 hours) 18 0425  XR CHEST PORT Final result Impression:  IMPRESSION:  
   
Resolved trace left pleural effusion. Clear lungs. Narrative:  EXAM:  XR CHEST PORT INDICATION: Heart surgery. Trace left pleural effusion. COMPARISON: 2018 at 0358 hours TECHNIQUE: Portable AP semierect upright chest view at 0341 hours FINDINGS: The right IJ catheter is stable. Cardiac monitoring wires overlie the  
thorax. The cardiomediastinal contours are stable. The pulmonary vasculature is  
within normal limits. The trace left pleural effusion is resolved. The lungs and pleural spaces are  
clear. There is no pneumothorax. The bones and upper abdomen are stable. 18 0422  XR CHEST PORT Final result Impression:  IMPRESSION:  
   
Interval subtle regression of interstitial edema. Probable trace left pleural effusion. Narrative:  INDICATION: Postop heart. EXAM:  
   
Portable AP chest radiograph was obtained at 0356 hours on 2018. FINDINGS:  
   
Comparison studies:  2018. The heart is borderline  in size. The lungs are moderately well expanded bilaterally. The visualized bones are grossly within normal limits. Central line tip over superior vena cava. Slight blunting of left costophrenic  
angle. Interval subtle regression of interstitial edema. 12/05/18 1340  XR CHEST PORT Final result Impression:  IMPRESSION: Appropriate central line placement. Mild patchy airspace disease  
left lower lobe. Narrative:  EXAM:  XR CHEST PORT INDICATION:  Postop heart COMPARISON:  November 21 FINDINGS: A portable AP radiograph of the chest was obtained at 1332 hours. A  
right jugular line tip is in the region of the superior vena cava. The patient  
is on a cardiac monitor. There is minimal patchy airspace disease in the left  
lower lobe. There is atherosclerosis of the aorta. The bones and soft tissues  
are grossly within normal limits. Admission Weight: Last Weight Weight: 200 lb 6 oz (90.9 kg) Weight: 198 lb 13.7 oz (90.2 kg) EXAM: 
General: Pleasant, in no apparent Lungs:   Clear to auscultation bilaterally. Incision:  Groin sites C/D/I w/ ecchymosis, no hematoma seen Heart:  Regular rate and rhythm, S1, S2 normal, no murmur, click, rub or gallop. Abdomen:   Soft, non-tender. Bowel sounds normal. No masses,  No organomegaly. Extremities:  No edema. PPP Neurologic:  Gross motor and sensory apparatus intact. Activity: ad robin Diet: Cardiac diet Lab Data Reviewed:  
Recent Labs 12/07/18 
0349  12/05/18 
1333 WBC 3.2*   < > 4.5 HGB 8.1*   < > 9.3* HCT 25.4*   < > 28.9*  
   < > 199    < > 144 K 4.2   < > 3.6 BUN 16   < > 16 CREA 1.26*   < > 1.17* GLU 84   < > 98 INR  --   --  1.1  
 < > = values in this interval not displayed. Assessment:  
 
Active Problems: 
  Non-rheumatic mitral regurgitation (11/16/2018) Mitral regurgitation (2018) Plan/Recommendations/Medical Decision Makin. s/p TMVr: No asa, as pt is on plavix/eliquis--resume both. 2. Afib (3/2016) - Resumed eliquis, amio, BB 3. CAD s/p STEMI & MARTHA to Diag (2018): Resumed plavix, statin, BB. No asa d/t plavix/eliquis. 4. HTN -  increase metoprolol to 25 mg bid, add noravsc 5 mg 5. Atelectasis w/ hx of Asthma - cont steroid nebs, PRN albuterol nebs. On RA. IS and activity as tolerated. 6. Hypothyroid - recheck thyroid panel. TSH low 0.06. Reduced synthroid to 100 mcg. Need recheck labs as outpt. 7. Obesity- dietary counseling given. 8. OA - resume Keppra for neuropathic pain?  Uses rollator 9. Urinary incontinence, recurrent UTIs-  resume oxybutynin. 10. Depression - no current Rx 11. Renal insufficiency: stable. Monitor Cr 
12. Postop anemia s/t acute blood loss, chronic anemia:  Monitor H/H. Start PO iron/Vit C.   
13. Constipation: +flatus, no BM yet. Cont bowel regimen, no abd pain/N/V 
14. Dispo:  PT/OT. R groin stitch removed, d/c today. Pt with plans to go home with son in [place] on , will f/u with PCP and return to our office for 1 month appt in January. Pt does not feel she needs home health to see her tomorrow Signed By: Sonam Asher NP

## 2018-12-07 NOTE — PROGRESS NOTES
Cardiac Surgery Care Coordinator-  Met with Juni Mcqueen and her son, she is preparing for immediate discharge. Reviewed plan of care and discharge instructions. Reviewed the importance of daily temp and weight monitoring, discussed groin site care and reviewed signs and symptoms of infection. Red reminder bracelet placed on right wrist, reviewed purpose of the bracelet and when to call the MD. Reminded pt of appts and encouraged participation in the Cardiac Wellness and rehab program after discharge. Unable to locate Take5 card, will mail hard copy to home address. Mr Serena Woody stated his mother will be staying at his home in [address] [facility] [address], [address] to verbalize and emotional support given. Kathyamarine Mcqueen is without questions or concerns at this time.  Sandi Rodriguez RN

## 2018-12-07 NOTE — PROGRESS NOTES
Cardiac Surgery Care Coordinator-  Met with Domi Szymanski, her sister and son. Reviewed plan of care and discussed goals for the day. Domi Szymanski has a good understanding of her  plan for the day. Discussed possible discharge date and encouraged Domi Szymanski to verbalize. Will continue to follow for educational and emotional needs.  Josh Funez RN

## 2018-12-07 NOTE — PROGRESS NOTES
0730  Bedside shift change report given to Hector Valenzuela RN (oncoming nurse) by Uma Mckeon RN (offgoing nurse). Report included the following information SBAR, Kardex, Procedure Summary, Intake/Output, MAR, Recent Results and Med Rec Status. 1500 Educated patient and son for discharge, medication, and follow up instructions. Time given for questions. IV and telemetry removed. Family packed room. Problem: Pressure Injury - Risk of 
Goal: *Prevention of pressure injury Document Sunil Scale and appropriate interventions in the flowsheet. Outcome: Progressing Towards Goal 
Pressure Injury Interventions: 
Sensory Interventions: Assess changes in LOC Moisture Interventions: Check for incontinence Q2 hours and as needed Activity Interventions: Increase time out of bed Mobility Interventions: Chair cushion Nutrition Interventions: Document food/fluid/supplement intake Friction and Shear Interventions: HOB 30 degrees or less Problem: Falls - Risk of 
Goal: *Absence of Falls Document Yumiko Barrow Fall Risk and appropriate interventions in the flowsheet. Outcome: Progressing Towards Goal 
Fall Risk Interventions: 
Mobility Interventions: Communicate number of staff needed for ambulation/transfer Medication Interventions: Patient to call before getting OOB Elimination Interventions: Call light in reach History of Falls Interventions: Room close to nurse's station

## 2018-12-07 NOTE — PROGRESS NOTES
Spiritual Care Partner Volunteer visited patient in room 454/01 on 12.07.18. Documented by: : Rev. Adrian Jolley. Leandro Kendrick; [facility], to contact [phone] [address] call: [phone]

## 2018-12-07 NOTE — PROGRESS NOTES
Problem: Mobility Impaired (Adult and Pediatric) Goal: *Acute Goals and Plan of Care (Insert Text) Physical Therapy Goals Initiated 12/6/2018 1. Patient will move from supine to sit and sit to supine , scoot up and down and roll side to side in bed with modified independence within 7 day(s). 2.  Patient will transfer from bed to chair and chair to bed with modified independence using the least restrictive device within 7 day(s). 3.  Patient will perform sit to stand with supervision/set-up within 7 day(s). 4.  Patient will ambulate with supervision/set-up for 50 feet with the least restrictive device within 7 day(s). physical Therapy TREATMENT Patient: Marli Farris (73 y.o. female) Date: 12/7/2018 Diagnosis: MITRAL REGURGITATION Mitral regurgitation <principal problem not specified> Procedure(s) (LRB): 
TRANSCATHETER MITRAL VALVE REPAIR, MITRAL CLIP (N/A) 2 Days Post-Op Precautions:   
Chart, physical therapy assessment, plan of care and goals were reviewed. ASSESSMENT: 
Chart reviewed and RN approved patient for mobility. Patient received lying in bed agreeable to work with therapy. Patient transferred supine to sitting EOB. BP sitting /79. Sit<>stand transfers with SBA. Ambulated 30ft to/from bathroom with CGA and HHA x1 while demonstrating wide KATHARINE, antalgic gait, and shuffled steps. Patient reporting back and R hip pain with mobility. Patient returned to bed and left with all needs met. Patient reports her mobility is close to baseline. She reports she is going to stay with her son in [place] upon discharge as he works from home and will be able to provide 24/7 supervision. Recommending HH PT upon discharge once medically stable. Progression toward goals: 
[x]    Improving appropriately and progressing toward goals 
[]    Improving slowly and progressing toward goals 
[]    Not making progress toward goals and plan of care will be adjusted PLAN: 
 Patient continues to benefit from skilled intervention to address the above impairments. Continue treatment per established plan of care. Discharge Recommendations:  Home Health Further Equipment Recommendations for Discharge:  None SUBJECTIVE:  
Patient stated I am going to [place] when I leave.  OBJECTIVE DATA SUMMARY:  
Critical Behavior: 
Neurologic State: Alert Orientation Level: Oriented X4 Cognition: Appropriate decision making, Appropriate for age attention/concentration, Appropriate safety awareness Safety/Judgement: Awareness of environment, Insight into deficits Functional Mobility Training: 
Bed Mobility: 
  
Supine to Sit: Stand-by assistance Sit to Supine: Moderate assistance(BLE management) Scooting: Stand-by assistance Transfers: 
Sit to Stand: Stand-by assistance Stand to Sit: Stand-by assistance Balance: 
Sitting: Intact Standing: Impaired Standing - Static: Good Standing - Dynamic : FairAmbulation/Gait Training: 
Distance (ft): 30 Feet (ft) Assistive Device: Gait belt(HHA x1) Ambulation - Level of Assistance: Contact guard assistance Gait Abnormalities: Antalgic;Decreased step clearance;Shuffling gait;Trunk sway increased Base of Support: Widened Stance: Right decreased Speed/Emilia: Shuffled; Slow Step Length: Left shortened;Right shortened Stairs: 
  
  
   
 
Neuro Re-Education: 
 
Therapeutic Exercises:  
 Reviewed ankle pumps and recommended patient perform them during flight to increase circulation Pain: 
Pain Scale 1: Numeric (0 - 10) Pain Intensity 1: 0 Activity Tolerance:  
Pre activity: 146/79 Post activity: 150/50 Please refer to the flowsheet for vital signs taken during this treatment. After treatment:  
[]    Patient left in no apparent distress sitting up in chair 
[x]    Patient left in no apparent distress in bed 
[x]    Call bell left within reach [x]    Nursing notified 
[]    Caregiver present 
[]    Bed alarm activated COMMUNICATION/COLLABORATION:  
The patients plan of care was discussed with: Occupational Therapist and Registered Nurse Alessio Mars, PT, DPT Time Calculation: 20 mins

## 2018-12-11 DIAGNOSIS — I25.118 CORONARY ARTERY DISEASE OF NATIVE HEART WITH STABLE ANGINA PECTORIS, UNSPECIFIED VESSEL OR LESION TYPE (HCC): ICD-10-CM

## 2018-12-11 DIAGNOSIS — Z95.5 S/P DRUG ELUTING CORONARY STENT PLACEMENT: ICD-10-CM

## 2018-12-11 DIAGNOSIS — J45.909 UNCOMPLICATED ASTHMA, UNSPECIFIED ASTHMA SEVERITY, UNSPECIFIED WHETHER PERSISTENT: ICD-10-CM

## 2018-12-11 DIAGNOSIS — I34.0 MITRAL VALVE INSUFFICIENCY, UNSPECIFIED ETIOLOGY: Primary | ICD-10-CM

## 2018-12-11 DIAGNOSIS — I48.0 PAROXYSMAL ATRIAL FIBRILLATION (HCC): ICD-10-CM

## 2018-12-11 DIAGNOSIS — Z98.890 S/P MITRAL VALVE REPAIR: ICD-10-CM

## 2019-01-07 ENCOUNTER — TELEPHONE (OUTPATIENT)
Dept: CARDIAC REHAB | Age: 74
End: 2019-01-07

## 2019-01-07 NOTE — TELEPHONE ENCOUNTER
1/7/2019 Cardiac Rehab: Called Ms. Kate Oswald to discuss participation in the Cardiac Rehab Program following TMVR on 12/5/2018. According to IP note, patient will be referred to [facility] d/t location, but also may move to [place] to live with son. I called but there was no voicemail to leave a message on and nobody answered the call.  Colton Modi

## 2019-01-14 ENCOUNTER — OFFICE VISIT (OUTPATIENT)
Dept: CARDIOLOGY CLINIC | Age: 74
End: 2019-01-14

## 2019-01-14 ENCOUNTER — HOSPITAL ENCOUNTER (OUTPATIENT)
Dept: NON INVASIVE DIAGNOSTICS | Age: 74
Discharge: HOME | End: 2019-01-14
Attending: NURSE PRACTITIONER
Payer: MEDICARE

## 2019-01-14 VITALS
DIASTOLIC BLOOD PRESSURE: 80 MMHG | HEART RATE: 57 BPM | WEIGHT: 196.5 LBS | RESPIRATION RATE: 16 BRPM | SYSTOLIC BLOOD PRESSURE: 148 MMHG | BODY MASS INDEX: 37.1 KG/M2 | TEMPERATURE: 98.4 F | OXYGEN SATURATION: 99 % | HEIGHT: 61 IN

## 2019-01-14 DIAGNOSIS — Z95.5 S/P DRUG ELUTING CORONARY STENT PLACEMENT: ICD-10-CM

## 2019-01-14 DIAGNOSIS — I48.0 PAROXYSMAL ATRIAL FIBRILLATION (HCC): ICD-10-CM

## 2019-01-14 DIAGNOSIS — I10 ESSENTIAL HYPERTENSION: ICD-10-CM

## 2019-01-14 DIAGNOSIS — Z98.890 S/P MITRAL VALVE REPAIR: ICD-10-CM

## 2019-01-14 DIAGNOSIS — I25.10 CORONARY ARTERY DISEASE INVOLVING NATIVE CORONARY ARTERY OF NATIVE HEART WITHOUT ANGINA PECTORIS: ICD-10-CM

## 2019-01-14 DIAGNOSIS — I25.118 CORONARY ARTERY DISEASE OF NATIVE HEART WITH STABLE ANGINA PECTORIS, UNSPECIFIED VESSEL OR LESION TYPE (HCC): ICD-10-CM

## 2019-01-14 DIAGNOSIS — I34.0 MITRAL VALVE INSUFFICIENCY, UNSPECIFIED ETIOLOGY: ICD-10-CM

## 2019-01-14 DIAGNOSIS — J45.909 UNCOMPLICATED ASTHMA, UNSPECIFIED ASTHMA SEVERITY, UNSPECIFIED WHETHER PERSISTENT: ICD-10-CM

## 2019-01-14 DIAGNOSIS — I34.0 MITRAL VALVE INSUFFICIENCY, UNSPECIFIED ETIOLOGY: Primary | ICD-10-CM

## 2019-01-14 PROCEDURE — 93306 TTE W/DOPPLER COMPLETE: CPT

## 2019-01-14 RX ORDER — ISOSORBIDE MONONITRATE 30 MG/1
30 TABLET, EXTENDED RELEASE ORAL DAILY
COMMUNITY

## 2019-01-14 RX ORDER — ONDANSETRON 4 MG/1
4 TABLET, ORALLY DISINTEGRATING ORAL
COMMUNITY

## 2019-01-14 RX ORDER — BUMETANIDE 0.5 MG/1
TABLET ORAL DAILY
Status: CANCELLED | OUTPATIENT
Start: 2019-01-14

## 2019-01-14 RX ORDER — VITAMIN E 1000 UNIT
1000 CAPSULE ORAL DAILY
COMMUNITY

## 2019-01-14 RX ORDER — LANOLIN ALCOHOL/MO/W.PET/CERES
325 CREAM (GRAM) TOPICAL
COMMUNITY
End: 2020-01-14

## 2019-01-14 RX ORDER — ZINC GLUCONATE 10 MG
250 LOZENGE ORAL
COMMUNITY

## 2019-01-14 RX ORDER — CHOLECALCIFEROL (VITAMIN D3) 125 MCG
10 CAPSULE ORAL
COMMUNITY

## 2019-01-14 NOTE — PROGRESS NOTES
Patient: Yanira Bateman   Age: 74 y.o. Patient Care Team:  Saniya Cantu MD as PCP - General (Internal Medicine)  Samantha Torres MD (Cardiology)  Hamilton Melendez MD (Cardiothoracic Surgery)    PCP: Saniya Cantu MD    Cardiologist: Arley Palacio    Diagnosis/Reason for Consultation: The primary encounter diagnosis was Mitral valve insufficiency, unspecified etiology. Diagnoses of S/P mitral valve repair, Paroxysmal atrial fibrillation (HCC), Essential hypertension, Coronary artery disease involving native coronary artery of native heart without angina pectoris, S/P drug eluting coronary stent placement, and Uncomplicated asthma, unspecified asthma severity, unspecified whether persistent were also pertinent to this visit. Problem List:   Patient Active Problem List   Diagnosis Code    Non-rheumatic mitral regurgitation I34.0    Acquired hypothyroidism E03.9    Paroxysmal atrial fibrillation (HCC) I48.0    Essential hypertension I10    Coronary artery disease involving native coronary artery of native heart I25.10    Severe obesity (HCC) E66.01    Mitral regurgitation I34.0      HPI: 74 y.o.  female s/p TMVr (2 XT MitraClips at A2 P2 severe to mild/mod severity) on 12/6/2018 for severe and symptomatic MR. She was discharged to home on POD#2. She was not seen immediately post-op as pt went to recover w/ her son in [place]. She has not seen any providers since her MitraClip discharge and is here today for her 30 day post-op evaluation. Ms. Priya Milton apparently flew into [place] on Saturday and has a flight booked back to [facility] tomorrow. She c/o considerable fatigue and activity tolerance and SOB/SANDERS. She and her sister used [employer] parking today and she still required a wheelchair to get to our office.   She denies any CP, chest tightness, lightheadedness, palpitations, orthopnea, PND, syncope or falls but does admit to some worsening LE and abdominal edema, early satiety and decreased appetite as well as a wt gain of approximately 10 lbs since discharge. She was not on a diuretic prior to her clip and was not discharged with one either. She has recently completed the stool softeners and iron supplements she was discharged with. She reports continued dark stools but denies any blood/blackness or tarriness. Ms. Henry Reynoso remains independent in her ADLs. When in [facility], she lives w/ her son, Margot Beltran (Phone#  [phone]). He works from home as a . PMHx PMHx of MR, Afib (3/2016), CAD s/p STEMI & MARTHA to Diag (9/2018), HTN, Asthma, Hypothyroid, Obesity, OA, Urinary incontinence, recurrent UTIs, Depression, PVD w/ chronic venous stasis ulcers, DVT, colon polyps, s/p terence, s/p SON/BSO, s/p  L TKR (2006), s/p R cataract removal (2007), past smoker (quit 1985)  Ms. Henry Reynoso has been followed by Dr. Sruthi Modi for a bit over 3 yrs when her PCP initially referred her for Afib.  She was recently hospitalized for Afib w/ RVR and CHF.  During that hospitalization there is a report of rhythm deterioration to VT and Vfib requiring defibrillation.  She had subsequent PAF while hospitalized but was ultimately discharged in SR after chemical cardioversion w/ amiodarone.      NYHA Classification: III   Class III (Moderate): Marked limitation of physical activity.  Comfortable at rest, but less than ordinary activity causes fatigue, palpitation, or dyspnea     Angina Classification: 0   Class 0: No symptoms      Past Medical History:   Diagnosis Date    Arrhythmia     \"SKIPS A BEAT\"    Arthritis     Asthma     Chronic pain     BACK    Diabetes ([facility] [address]     BORDERLINE    Endocrine disease     thyroid    Hypertension     Psychiatric disorder     depression    Seizures ([facility] [address]     \"I'M TAKING MEDICINE FOR SEIZURES, THEY SAY I HAD ONE, ONE TIME\"    Thromboembolus ([facility] [address]     LEFT DVT    Thyroid disease        Past Surgical History:   Procedure Laterality Date    HX APPENDECTOMY      HX CHOLECYSTECTOMY      HX ORTHOPAEDIC      LEFT TKR    HX OTHER SURGICAL      SKIN GRAFT ON LEFT LEG    HX SON AND BSO        Social History     Tobacco Use    Smoking status: Former Smoker     Years: 15.00     Last attempt to quit: 1993     Years since quittin.1    Smokeless tobacco: Never Used    Tobacco comment: PACK LASTED A WEEK   Substance Use Topics    Alcohol use: Yes     Comment: WINE NIGHTLY MIXED WITH 7-UP      Family History   Problem Relation Age of Onset    Stroke Mother     Alcohol abuse Father     Liver Disease Father     Kidney Disease Sister     Anesth Problems Sister         \"HAD A HARD TIME COMING OUT OF IT OR SOMETHING\"    No Known Problems Son     Kidney Disease Sister     Asthma Daughter      Prior to Admission medications    Medication Sig Start Date End Date Taking? Authorizing Provider   ascorbic acid, vitamin C, (VITAMIN C) 1,000 mg tablet Take 1,000 mg by mouth daily. Yes Provider, Historical   ferrous sulfate 325 mg (65 mg iron) tablet Take 325 mg by mouth Daily (before breakfast). Yes Provider, Historical   SENNA-DOCUSATE SODIUM PO Take  by mouth two (2) times a day. Yes Provider, Historical   isosorbide mononitrate ER (IMDUR) 30 mg tablet Take 30 mg by mouth daily. Yes Provider, Historical   melatonin tab tablet Take 10 mg by mouth nightly. Yes Provider, Historical   ondansetron (ZOFRAN ODT) 4 mg disintegrating tablet Take 4 mg by mouth every eight (8) hours as needed for Nausea. Yes Provider, Historical   magnesium 250 mg tab Take 250 mg by mouth nightly. Yes Provider, Historical   acetaminophen (TYLENOL) 325 mg tablet Take 2 Tabs by mouth every four (4) hours as needed. May purchase over the counter 18  Yes Zehra Anderson NP   amLODIPine (NORVASC) 5 mg tablet Take 1 Tab by mouth daily for 60 days.  18 Yes Jamarcus SCHILLING NP   levothyroxine (SYNTHROID) 100 mcg tablet Take 1 Tab by mouth Daily (before breakfast) for 60 days. 12/7/18 2/5/19 Yes Bhupendra SCHILLING NP   albuterol (PROVENTIL HFA, VENTOLIN HFA, PROAIR HFA) 90 mcg/actuation inhaler Take 2 Puffs by inhalation every four (4) hours as needed for Wheezing. Yes Provider, Historical   albuterol sulfate (PROVENTIL;VENTOLIN) 2.5 mg/0.5 mL nebu nebulizer solution by Nebulization route every six (6) hours as needed for Wheezing. Yes Provider, Historical   amiodarone (CORDARONE) 200 mg tablet Take 200 mg by mouth daily. Yes Provider, Historical   apixaban (ELIQUIS) 5 mg tablet Take 5 mg by mouth two (2) times a day. Yes Provider, Historical   atorvastatin (LIPITOR) 40 mg tablet Take 40 mg by mouth daily. Yes Provider, Historical   cetirizine (ZYRTEC) 10 mg tablet Take 10 mg by mouth daily. Yes Provider, Historical   cholecalciferol (VITAMIN D3) 1,000 unit tablet Take 1,000 Units by mouth daily. Yes Provider, Historical   clopidogrel (PLAVIX) 75 mg tab Take 75 mg by mouth daily. Yes Provider, Historical   guaiFENesin-codeine (ROBITUSSIN AC) 100-10 mg/5 mL solution Take 5 mL by mouth three (3) times daily as needed for Cough. Yes Provider, Historical   cranberry 405 mg cap Take  by mouth daily. Yes Provider, Historical   diphenhydrAMINE (BENADRYL) 25 mg tablet Take 25 mg by mouth three (3) times daily as needed for Itching. Yes Provider, Historical   levETIRAcetam (KEPPRA) 500 mg tablet Take 500 mg by mouth two (2) times a day. Yes Provider, Historical   metoprolol succinate (TOPROL-XL) 25 mg XL tablet Take 25 mg by mouth daily. Yes Provider, Historical   oxybutynin (DITROPAN) 5 mg tablet Take 5 mg by mouth two (2) times a day. Yes Provider, Historical   vitamin E (AQUA GEMS) 400 unit capsule Take 400 Units by mouth daily. Yes Provider, Historical   multivitamin (ONE A DAY) tablet Take 1 Tab by mouth daily.      Yes Other, MD Jeremie   budesonide-formoterol (SYMBICORT) 160-4.5 mcg/actuation HFA inhaler Take 2 Puffs by inhalation two (2) times a day. Yes Other, MD Jeremie       Allergies   Allergen Reactions    Latex Hives    Sulfa Dyne Anaphylaxis and Swelling     Mouth and tongue    Barium Sulfate Hives       Current Medications:   Current Outpatient Medications   Medication Sig Dispense Refill    ascorbic acid, vitamin C, (VITAMIN C) 1,000 mg tablet Take 1,000 mg by mouth daily.  ferrous sulfate 325 mg (65 mg iron) tablet Take 325 mg by mouth Daily (before breakfast).  SENNA-DOCUSATE SODIUM PO Take  by mouth two (2) times a day.  isosorbide mononitrate ER (IMDUR) 30 mg tablet Take 30 mg by mouth daily.  melatonin tab tablet Take 10 mg by mouth nightly.  ondansetron (ZOFRAN ODT) 4 mg disintegrating tablet Take 4 mg by mouth every eight (8) hours as needed for Nausea.  magnesium 250 mg tab Take 250 mg by mouth nightly.  acetaminophen (TYLENOL) 325 mg tablet Take 2 Tabs by mouth every four (4) hours as needed. May purchase over the counter 30 Tab 0    amLODIPine (NORVASC) 5 mg tablet Take 1 Tab by mouth daily for 60 days. 30 Tab 1    levothyroxine (SYNTHROID) 100 mcg tablet Take 1 Tab by mouth Daily (before breakfast) for 60 days. 30 Tab 1    albuterol (PROVENTIL HFA, VENTOLIN HFA, PROAIR HFA) 90 mcg/actuation inhaler Take 2 Puffs by inhalation every four (4) hours as needed for Wheezing.  albuterol sulfate (PROVENTIL;VENTOLIN) 2.5 mg/0.5 mL nebu nebulizer solution by Nebulization route every six (6) hours as needed for Wheezing.  amiodarone (CORDARONE) 200 mg tablet Take 200 mg by mouth daily.  apixaban (ELIQUIS) 5 mg tablet Take 5 mg by mouth two (2) times a day.  atorvastatin (LIPITOR) 40 mg tablet Take 40 mg by mouth daily.  cetirizine (ZYRTEC) 10 mg tablet Take 10 mg by mouth daily.  cholecalciferol (VITAMIN D3) 1,000 unit tablet Take 1,000 Units by mouth daily.  clopidogrel (PLAVIX) 75 mg tab Take 75 mg by mouth daily.       guaiFENesin-codeine (ROBITUSSIN AC) 100-10 mg/5 mL solution Take 5 mL by mouth three (3) times daily as needed for Cough.  cranberry 405 mg cap Take  by mouth daily.  diphenhydrAMINE (BENADRYL) 25 mg tablet Take 25 mg by mouth three (3) times daily as needed for Itching.  levETIRAcetam (KEPPRA) 500 mg tablet Take 500 mg by mouth two (2) times a day.  metoprolol succinate (TOPROL-XL) 25 mg XL tablet Take 25 mg by mouth daily.  oxybutynin (DITROPAN) 5 mg tablet Take 5 mg by mouth two (2) times a day.  vitamin E (AQUA GEMS) 400 unit capsule Take 400 Units by mouth daily.  multivitamin (ONE A DAY) tablet Take 1 Tab by mouth daily.  budesonide-formoterol (SYMBICORT) 160-4.5 mcg/actuation HFA inhaler Take 2 Puffs by inhalation two (2) times a day. Vitals: Blood pressure 148/80, pulse (!) 57, temperature 98.4 °F (36.9 °C), temperature source Oral, resp. rate 16, height 5' 1\" (1.549 m), weight 196 lb 8 oz (89.1 kg), SpO2 99 %. Allergies: is allergic to latex; sulfa dyne; and barium sulfate.     Review of Systems: Pertinent Positives per HPI   [x]Total of 13 systems reviewed as follows:  Constitutional: Negative fever, negative chills  Eyes:               Negative for amauroses fugax  ENT:                Negative sore throat,oral absecess  Endocrine        Negative for thyroid replacement Rx; goiter; DM  Respiratory:     Negative chronic cough,sputum production  Cards:              Negative for palpitations, varicosities, claudication  GI:                   Negative for dysphagia, bleeding, nausea, vomiting, diarrhea, and abdominal pain  Genitourinary: Negative for frequency, dysuria  Integument:     Negative for rash and pruritus  Hematologic:   Negative for easy bruising; bleeding dyscarsia  Musculoskel:   Negative for muscle weakness inhibiting ambulation  Neurological:   Negative for stroke, TIA, syncope, dizziness  Behavl/Psych: Negative for feelings of anxiety, depression     Cardiovascular Testing:   TTE today:  Report not available at time of this note    Physical Exam:  General: Well nourished well groomed elderly woman appearing older than stated age accompanied by her sister  Neuro: A&OX3. GRAYSON. PERRL. Pt in wheelchair today. Has cane with her. Typically uses rollator  Head:Normocephalic. Atraumatic. Symmetrical  Neck: Trachea Midline  Resp: CTA B. No Adv BS/cough/sputum/tachypnea with seated conversation  CV: S1S2 RRR. MONTSE II/VI. No JVD/carotid bruits. Pink/warm/dry extremities. Mod LE peripheral edema  GI:Benign ab. Soft. NT/ND. Active BS  : Voids  Integ: No obvious s/s of infection or breakdown  Musculo/Skeletal: FROM in all major joints. Modest muscle tone    Clinic Evaluation:   KCCQ-12: scanned into EMR    6 minute walk test: PreTest HR/02 sat:  Pt refused d/t SANDERS    Assessment/Plan:   1. MR s/p TMVr- No clinical change s/p MitraClip - needs improved medical management. Volume overload today. To start low dose bumex. Pt to fly back to [facility] tomorrow and only f/u appt currently scheduled is for Dr. Whelan November in April. Called son and discussed on speaker w/ son, pt and her sister that pt needs provider f/u in 1-2 weeks to re-eval volume status and to monitor daily wts in the interim and to call us if wt increases and/or BP drops below . Per son, has PCP named Anais Lambert w/ [facility] in [facility] (Office: [phone] / Fax: [phone]). Pt doesn't have cardiologist in [facility] and suggested they get cards recommendation from PCP. Pt would also benefit from cardiac rehab/supervised exercise regimen and suggested they explore this with upcoming appt as well. Pt/son expressed understanding and agreement. No ASA d/t clopdigrel and apixaban or CAD and Afib. Would need ASA 81mg daily for valve patency if to stop aforementioned meds. Has f/u w/ primary cardiologist in April when she returns from [place]. RTC at one yr anniversary with TTE per TVT Registry Guidelines.   2. Afib (3/2016) - amio/BB/apixaban per cards  3. CAD s/p STEMI & MARTHA to Diag (9/2018) - clopidogrel/statin/BB  4. HTN - BB/CCB/nitrate per cards  5. Asthma - Symbicort/Albuterol MDI and albuterol nebs per PCP  6. Hypothyroid - synthroid per PCP  7. Obesity- dietary counseling given. 8. OA - ? Keppra for neuropathic pain? Uses rollator typically  9. Urinary incontinence, recurrent UTIs - oxybutynin per PCP  10. Depression - no current Rx  11. Dr. Theresa Montoya not available at time of appt today (emergent OR). To review with him/Dr. Abi Hairston when they are available.

## 2019-01-17 ENCOUNTER — TELEPHONE (OUTPATIENT)
Dept: CARDIAC REHAB | Age: 74
End: 2019-01-17

## 2019-01-17 NOTE — TELEPHONE ENCOUNTER
1/17/2019 Cardiac Rehab: Second call to Ms. Leslie Dale to discuss her participation in [address] [place] [address] at [facility], but she will be going to [facility] to stay with her son. Unable to leave voicemail message, as there is not machine. Calvin Vaca     1/7/2019 Cardiac Rehab: Called Ms. Leslie Dale to discuss participation in the Cardiac Rehab Program following TMVR on 12/5/2018. According to IP note, patient will be referred to [facility] d/t location, but also may move to [place] to live with son. I called but there was no voicemail to leave a message on and nobody answered the call.  Calvin Vaca

## 2020-01-03 DIAGNOSIS — Z98.890 S/P MITRAL VALVE REPAIR: ICD-10-CM

## 2020-01-03 DIAGNOSIS — I34.0 MITRAL VALVE INSUFFICIENCY, UNSPECIFIED ETIOLOGY: Primary | ICD-10-CM

## 2020-01-03 DIAGNOSIS — I48.0 PAROXYSMAL ATRIAL FIBRILLATION (HCC): ICD-10-CM

## 2020-01-03 DIAGNOSIS — J45.909 UNCOMPLICATED ASTHMA, UNSPECIFIED ASTHMA SEVERITY, UNSPECIFIED WHETHER PERSISTENT: ICD-10-CM

## 2020-01-03 DIAGNOSIS — Z95.5 S/P DRUG ELUTING CORONARY STENT PLACEMENT: ICD-10-CM

## 2020-01-03 DIAGNOSIS — I10 ESSENTIAL HYPERTENSION: ICD-10-CM

## 2020-01-03 DIAGNOSIS — I25.118 CORONARY ARTERY DISEASE OF NATIVE HEART WITH STABLE ANGINA PECTORIS, UNSPECIFIED VESSEL OR LESION TYPE (HCC): ICD-10-CM

## 2020-01-14 ENCOUNTER — OFFICE VISIT (OUTPATIENT)
Dept: CARDIOLOGY CLINIC | Age: 75
End: 2020-01-14

## 2020-01-14 ENCOUNTER — HOSPITAL ENCOUNTER (OUTPATIENT)
Dept: NON INVASIVE DIAGNOSTICS | Age: 75
Discharge: HOME | End: 2020-01-14
Attending: NURSE PRACTITIONER
Payer: MEDICARE

## 2020-01-14 VITALS
WEIGHT: 199 LBS | OXYGEN SATURATION: 98 % | HEART RATE: 58 BPM | SYSTOLIC BLOOD PRESSURE: 150 MMHG | DIASTOLIC BLOOD PRESSURE: 82 MMHG | TEMPERATURE: 98.1 F | BODY MASS INDEX: 31.23 KG/M2 | RESPIRATION RATE: 16 BRPM | HEIGHT: 67 IN

## 2020-01-14 VITALS
DIASTOLIC BLOOD PRESSURE: 82 MMHG | SYSTOLIC BLOOD PRESSURE: 154 MMHG | HEIGHT: 67 IN | BODY MASS INDEX: 30.8 KG/M2 | WEIGHT: 196.21 LBS

## 2020-01-14 DIAGNOSIS — Z95.5 S/P DRUG ELUTING CORONARY STENT PLACEMENT: ICD-10-CM

## 2020-01-14 DIAGNOSIS — I34.0 MITRAL VALVE INSUFFICIENCY, UNSPECIFIED ETIOLOGY: Primary | ICD-10-CM

## 2020-01-14 DIAGNOSIS — J45.909 UNCOMPLICATED ASTHMA, UNSPECIFIED ASTHMA SEVERITY, UNSPECIFIED WHETHER PERSISTENT: ICD-10-CM

## 2020-01-14 DIAGNOSIS — I25.118 CORONARY ARTERY DISEASE OF NATIVE HEART WITH STABLE ANGINA PECTORIS, UNSPECIFIED VESSEL OR LESION TYPE (HCC): ICD-10-CM

## 2020-01-14 DIAGNOSIS — Z98.890 S/P MITRAL VALVE REPAIR: ICD-10-CM

## 2020-01-14 DIAGNOSIS — I10 ESSENTIAL HYPERTENSION: ICD-10-CM

## 2020-01-14 DIAGNOSIS — I34.0 MITRAL VALVE INSUFFICIENCY, UNSPECIFIED ETIOLOGY: ICD-10-CM

## 2020-01-14 DIAGNOSIS — I48.0 PAROXYSMAL ATRIAL FIBRILLATION (HCC): ICD-10-CM

## 2020-01-14 PROCEDURE — 93306 TTE W/DOPPLER COMPLETE: CPT

## 2020-01-14 RX ORDER — LISINOPRIL 40 MG/1
40 TABLET ORAL DAILY
COMMUNITY

## 2020-01-14 RX ORDER — AMLODIPINE BESYLATE 5 MG/1
5 TABLET ORAL DAILY
COMMUNITY

## 2020-01-14 RX ORDER — CHLORTHALIDONE 25 MG/1
TABLET ORAL DAILY
COMMUNITY

## 2020-01-14 RX ORDER — LEVOTHYROXINE SODIUM 150 UG/1
TABLET ORAL
COMMUNITY

## 2020-01-14 RX ORDER — LANOLIN ALCOHOL/MO/W.PET/CERES
1000 CREAM (GRAM) TOPICAL DAILY
COMMUNITY

## 2020-01-14 RX ORDER — ROSUVASTATIN CALCIUM 40 MG/1
40 TABLET, COATED ORAL
COMMUNITY

## 2020-01-14 NOTE — PROGRESS NOTES
Patient: Payton Keen   Age: 66 y.o. Patient Care Team:  Radha Walsh MD as PCP - General (Internal Medicine)  Lisa Nixon MD (Cardiology)  Humberto Figueroa MD (Cardiothoracic Surgery)    PCP: Radha Walsh MD    Cardiologist: Ingrid Ross    Diagnosis/Reason for Consultation: The primary encounter diagnosis was Mitral valve insufficiency, unspecified etiology. Diagnoses of S/P mitral valve repair, Paroxysmal atrial fibrillation (HCC), Coronary artery disease of native heart with stable angina pectoris, unspecified vessel or lesion type ([address] S/P drug eluting coronary stent placement, Uncomplicated asthma, unspecified asthma severity, unspecified whether persistent, and Essential hypertension were also pertinent to this visit. Problem List:   Patient Active Problem List   Diagnosis Code    Non-rheumatic mitral regurgitation I34.0    Acquired hypothyroidism E03.9    Paroxysmal atrial fibrillation (HCC) I48.0    Essential hypertension I10    Coronary artery disease involving native coronary artery of native heart I25.10    Severe obesity ([facility]) E66.01    Mitral regurgitation I34.0      HPI: 66 y.o.  female s/p TMVr (2 XT MitraClips at A2 P2 severe to mild/mod severity) on 12/6/2018 for severe and symptomatic MR. She was discharged to home on POD#2. She is here today for her 1 yr post-op evaluation.     Ms. Gay denies rehospitalization since her TMVr. She is limited now by back pain v. SOB/SANDERS or fatigue. She was only able to complete 3 min of her walk before back pain forced her to stop. She denies any CP, chest tightness, palpitations, lightheadedness, syncope, falls, orthopnea, PND, or LE edema. She also c/o a productive cough and has seen pulmonary and did not have much relief from the inhalers or decongestant she prescribed. She follows up with pulmonary in March. She follows up with Dr. Ingrid Ross for routine care in April.  She reports a good appetite and irregular stool pattern but this is normal for her. She denies any blood/blackness/tarriness of her stools. PMHx PMHx of MR, Afib (3/2016), CAD s/p STEMI & MARTHA to Diag (2018), HTN, Asthma, Hypothyroid, Obesity, OA, Urinary incontinence, recurrent UTIs, Depression, PVD w/ chronic venous stasis ulcers, DVT, colon polyps, s/p terence, s/p SON/BSO, s/p  L TKR (), s/p R cataract removal (), past smoker (quit )     NYHA Classification:  I   Class I (Mild): No limitation of physical activity. Ordinary physical activity does not cause undue fatigue, palpitation, or dyspnea. Angina Classification: 0   Class 0: No symptoms    Past Medical History:   Diagnosis Date    Arrhythmia     \"SKIPS A BEAT\"    Arthritis     Asthma     Chronic pain     BACK    Diabetes ([address]     BORDERLINE    Endocrine disease     thyroid    Hypertension     Psychiatric disorder     depression    Seizures ([facility]     \"I'M TAKING MEDICINE FOR SEIZURES, THEY SAY I HAD ONE, ONE TIME\"    Thromboembolus ([facility]     LEFT DVT    Thyroid disease        Past Surgical History:   Procedure Laterality Date    HX APPENDECTOMY      HX CHOLECYSTECTOMY      HX ORTHOPAEDIC      LEFT TKR    HX OTHER SURGICAL      SKIN GRAFT ON LEFT LEG    HX SON AND BSO        Social History     Tobacco Use    Smoking status: Former Smoker     Years: 15.00     Last attempt to quit: 1993     Years since quittin.1    Smokeless tobacco: Never Used    Tobacco comment: PACK LASTED A WEEK   Substance Use Topics    Alcohol use: Yes     Comment: WINE NIGHTLY MIXED WITH 7-UP      Family History   Problem Relation Age of Onset    Stroke Mother     Alcohol abuse Father     Liver Disease Father     Kidney Disease Sister     Anesth Problems Sister         \"HAD A HARD TIME COMING OUT OF IT OR SOMETHING\"    No Known Problems Son     Kidney Disease Sister     Asthma Daughter      Prior to Admission medications    Medication Sig Start Date End Date Taking? Authorizing Provider   rosuvastatin (CRESTOR) 40 mg tablet Take 40 mg by mouth nightly. Yes Provider, Historical   tiotropium-olodaterol (STIOLTO RESPIMAT) 2.5-2.5 mcg/actuation inhaler Take  by inhalation. Yes Provider, Historical   cyanocobalamin 1,000 mcg tablet Take 1,000 mcg by mouth daily. Yes Provider, Historical   lisinopril (PRINIVIL, ZESTRIL) 40 mg tablet Take 40 mg by mouth daily. Yes Provider, Historical   levothyroxine (SYNTHROID) 150 mcg tablet Take  by mouth Daily (before breakfast). Yes Provider, Historical   amLODIPine (NORVASC) 5 mg tablet Take 5 mg by mouth daily. Yes Provider, Historical   chlorthalidone (HYGROTEN) 25 mg tablet Take  by mouth daily. Yes Provider, Historical   ascorbic acid, vitamin C, (VITAMIN C) 1,000 mg tablet Take 1,000 mg by mouth daily. Yes Provider, Historical   isosorbide mononitrate ER (IMDUR) 30 mg tablet Take 30 mg by mouth daily. Yes Provider, Historical   melatonin tab tablet Take 10 mg by mouth nightly. Yes Provider, Historical   ondansetron (ZOFRAN ODT) 4 mg disintegrating tablet Take 4 mg by mouth every eight (8) hours as needed for Nausea. Yes Provider, Historical   magnesium 250 mg tab Take 250 mg by mouth nightly. Yes Provider, Historical   acetaminophen (TYLENOL) 325 mg tablet Take 2 Tabs by mouth every four (4) hours as needed. May purchase over the counter 12/7/18  Yes Gentry SCHILLING NP   albuterol (PROVENTIL HFA, VENTOLIN HFA, PROAIR HFA) 90 mcg/actuation inhaler Take 2 Puffs by inhalation every four (4) hours as needed for Wheezing. Yes Provider, Historical   albuterol sulfate (PROVENTIL;VENTOLIN) 2.5 mg/0.5 mL nebu nebulizer solution by Nebulization route every six (6) hours as needed for Wheezing. Yes Provider, Historical   apixaban (ELIQUIS) 5 mg tablet Take 5 mg by mouth two (2) times a day. Yes Provider, Historical   atorvastatin (LIPITOR) 40 mg tablet Take 40 mg by mouth daily.    Yes Provider, Historical cholecalciferol (VITAMIN D3) 1,000 unit tablet Take 1,000 Units by mouth daily. Yes Provider, Historical   guaiFENesin-codeine (ROBITUSSIN AC) 100-10 mg/5 mL solution Take 5 mL by mouth three (3) times daily as needed for Cough. Yes Provider, Historical   cranberry 405 mg cap Take  by mouth daily. Yes Provider, Historical   diphenhydrAMINE (BENADRYL) 25 mg tablet Take 25 mg by mouth three (3) times daily as needed for Itching. Yes Provider, Historical   levETIRAcetam (KEPPRA) 500 mg tablet Take 500 mg by mouth two (2) times a day. Yes Provider, Historical   metoprolol succinate (TOPROL-XL) 25 mg XL tablet Take 25 mg by mouth daily. Yes Provider, Historical   oxybutynin (DITROPAN) 5 mg tablet Take 5 mg by mouth two (2) times a day. Yes Provider, Historical   vitamin E (AQUA GEMS) 400 unit capsule Take 400 Units by mouth daily. Yes Provider, Historical   budesonide-formoterol (SYMBICORT) 160-4.5 mcg/actuation HFA inhaler Take 2 Puffs by inhalation two (2) times a day. Yes Other, MD Jeremie       Allergies   Allergen Reactions    Latex Hives    Sulfa Dyne Anaphylaxis and Swelling     Mouth and tongue    Barium Sulfate Hives       Current Medications:   Current Outpatient Medications   Medication Sig Dispense Refill    rosuvastatin (CRESTOR) 40 mg tablet Take 40 mg by mouth nightly.  tiotropium-olodaterol (STIOLTO RESPIMAT) 2.5-2.5 mcg/actuation inhaler Take  by inhalation.  cyanocobalamin 1,000 mcg tablet Take 1,000 mcg by mouth daily.  lisinopril (PRINIVIL, ZESTRIL) 40 mg tablet Take 40 mg by mouth daily.  levothyroxine (SYNTHROID) 150 mcg tablet Take  by mouth Daily (before breakfast).  amLODIPine (NORVASC) 5 mg tablet Take 5 mg by mouth daily.  chlorthalidone (HYGROTEN) 25 mg tablet Take  by mouth daily.  ascorbic acid, vitamin C, (VITAMIN C) 1,000 mg tablet Take 1,000 mg by mouth daily.       isosorbide mononitrate ER (IMDUR) 30 mg tablet Take 30 mg by mouth daily.  melatonin tab tablet Take 10 mg by mouth nightly.  ondansetron (ZOFRAN ODT) 4 mg disintegrating tablet Take 4 mg by mouth every eight (8) hours as needed for Nausea.  magnesium 250 mg tab Take 250 mg by mouth nightly.  acetaminophen (TYLENOL) 325 mg tablet Take 2 Tabs by mouth every four (4) hours as needed. May purchase over the counter 30 Tab 0    albuterol (PROVENTIL HFA, VENTOLIN HFA, PROAIR HFA) 90 mcg/actuation inhaler Take 2 Puffs by inhalation every four (4) hours as needed for Wheezing.  albuterol sulfate (PROVENTIL;VENTOLIN) 2.5 mg/0.5 mL nebu nebulizer solution by Nebulization route every six (6) hours as needed for Wheezing.  apixaban (ELIQUIS) 5 mg tablet Take 5 mg by mouth two (2) times a day.  atorvastatin (LIPITOR) 40 mg tablet Take 40 mg by mouth daily.  cholecalciferol (VITAMIN D3) 1,000 unit tablet Take 1,000 Units by mouth daily.  guaiFENesin-codeine (ROBITUSSIN AC) 100-10 mg/5 mL solution Take 5 mL by mouth three (3) times daily as needed for Cough.  cranberry 405 mg cap Take  by mouth daily.  diphenhydrAMINE (BENADRYL) 25 mg tablet Take 25 mg by mouth three (3) times daily as needed for Itching.  levETIRAcetam (KEPPRA) 500 mg tablet Take 500 mg by mouth two (2) times a day.  metoprolol succinate (TOPROL-XL) 25 mg XL tablet Take 25 mg by mouth daily.  oxybutynin (DITROPAN) 5 mg tablet Take 5 mg by mouth two (2) times a day.  vitamin E (AQUA GEMS) 400 unit capsule Take 400 Units by mouth daily.  budesonide-formoterol (SYMBICORT) 160-4.5 mcg/actuation HFA inhaler Take 2 Puffs by inhalation two (2) times a day. Vitals: Blood pressure 150/82, pulse (!) 58, temperature 98.1 °F (36.7 °C), temperature source Oral, resp. rate 16, height 5' 7\" (1.702 m), weight 199 lb (90.3 kg), SpO2 98 %. Allergies: is allergic to latex; sulfa dyne; and barium sulfate.     Review of Systems: Pertinent Positives per HPI   [x]? Total of 13 systems reviewed as follows:  Constitutional: Negative fever, negative chills  Eyes:               Negative for amauroses fugax  ENT:                Negative sore throat,oral absecess  Endocrine        Negative for thyroid goiter; DM  Respiratory:     Negative sputum production  Cards:              Negative for palpitations, varicosities, claudication  GI:                   Negative for dysphagia, bleeding, nausea, vomiting, diarrhea, and abdominal pain  Genitourinary: Negative for frequency, dysuria  Integument:     Negative for rash and pruritus  Hematologic:   Negative for easy bruising; bleeding dyscarsia  Musculoskel:   Negative for muscle weakness inhibiting ambulation  Neurological:   Negative for stroke, TIA, syncope, dizziness  Behavl/Psych: Negative for feelings of anxiety, depression      Cardiovascular Testing:   TTE today: Interpretation Summary   · Normal cavity size and systolic function (ejection fraction normal). Mild concentric hypertrophy. Estimated left ventricular ejection fraction is 55 - 60%. Inconclusive left ventricular diastolic function. · Mitral valve non-specific thickening. S/p MitraClip Mild mitral valve stenosis is present. Mild mitral valve regurgitation is present. · Moderately dilated left atrium. · Aortic valve sclerosis. Aortic valve mean gradient is 8.8 mmHg. Aortic valve area is 1.6 cm2. · Mild tricuspid valve regurgitation is present. · Moderately dilated right atrium. Echo Findings   Left Ventricle Normal cavity size and systolic function (ejection fraction normal). Mild concentric hypertrophy . The estimated ejection fraction is 55 - 60%. There is inconclusive left ventricular diastolic function. Left Atrium Moderately dilated left atrium. Right Ventricle Normal cavity size, wall thickness and global systolic function. Right Atrium Moderately dilated right atrium.    Aortic Valve Trileaflet valve structure, no stenosis and no regurgitation. Aortic valve sclerosis. Aortic valve mean gradient is 8.8 mmHg. Aortic valve area is 1.6 cm2. Mitral Valve Mitral valve non-specific thickening. S/p MitraClip Mitral valve mean gradient is 5.1 mmHg. Mitral valve area is 1.3 cm2. Mild stenosis present. Mild regurgitation. Tricuspid Valve Normal valve structure and no stenosis. Mild regurgitation. Pulmonic Valve Pulmonic valve not well visualized, but normal doppler findings. No stenosis and no regurgitation. Aorta Normal aortic root, ascending aortic, and aortic arch. Pericardium Normal pericardium and no evidence of pericardial effusion. 2D Volume Measurements    ESV ESV Index   LA Biplane 107.04 mL (Range: 22 - 52)       53.05 ml/m2 (Range: 16 - 28)         LA A4C 85.89 mL (Range: 22 - 52)       42.57 ml/m2 (Range: 16 - 28)         LA A2C 107. 67 mL (Range: 22 - 52)       53.36 ml/m2 (Range: 16 - 28)               2D/M-Mode Measurements   Dimensions   Measurement Value (Range)   LVIDs 3.48 cm      LVIDd 5.25 cm (3.9 - 5.3)      IVSd 1.26 cm (0.6 - 0.9)      LVPWd 1.12 cm (0.6 - 0.9)      LV Mass  g (67 - 162)      LV Mass AL Index 148.2 g/m2 (43 - 95)      Left Atrium Major Axis 4.1 cm      LA Area 4C 25.3 cm2      Left Atrium to Aortic Root Ratio 1.25       Tapse 2.15 cm (1.5 - 2.0)       M-Mode   Measurement Value (Range)   AV Cusp 0.94 cm               Aorta Measurements   Aorta   Measurement Value (Range)   Ao Root D 3.29 cm             Aortic Valve Measurements   Stenosis   Aortic Valve Systolic Peak Velocity 901.37 cm/s      AoV PG 16.9 mmHg      Aortic Valve Systolic Mean Gradient 8.8 mmHg      AoV VTI 52.55 cm      Aortic Valve Area by Continuity of VTI 1.6 cm2      Aortic Valve Area by Continuity of Peak Velocity 1.6 cm2       LVOT   LVOT Peak Velocity 104.58 cm/s      LVOT Peak Gradient 4.4 mmHg      LVOT VTI 26.73 cm      LVOT d 1.99 cm              Mitral Valve Measurements   Stenosis   MV Mean Gradient 5.1 mmHg      MV Peak Gradient 16.2 mmHg      Mitral Valve Pressure Half-time 144.1 ms      Mitral Valve Max Velocity 201.14 cm/s      MVA (PHT) 1.5 cm2       Regurgitation   MVA VTI 1.3 cm2              Tricuspid Valve Measurements   Regurgitation   Triscuspid Valve Regurgitation Peak Gradient 115.5 mmHg      TR Max Velocity 537.25 cm/s               Pulmonary Measurements   Stenosis/Regurgitation   PV peak gradient 9.6 mmHg      Pulmonic Valve Max Velocity 155.1 cm/s              Diastolic Filling/Shunts   Diastolic Filling   LV E' Septal Velocity 3.53 cm/s      E/E' septal 50.48       LV E' Lateral Velocity 3.85 cm/s      E/E' lateral 46.29       E/E' ratio (averaged) 48.39       Mitral Valve E Wave Deceleration Time 497.1 ms      MV E Bear 178.21 cm/s      MV A Bear 94.48 cm/s      MV E/A 1.89        Shunt   LVOT SV 83 ml              TTE 1/14/2019: SUMMARY: Left ventricle: Systolic function was normal. Ejection fraction was estimated in the range of 55 % to 60 %. There were no regional wall motion abnormalities. Features were consistent with a pseudonormal left ventricular filling pattern, with concomitant abnormal relaxation and increased filling pressure (grade 2 diastolic dysfunction). Left atrium: The atrium was severely dilated. Mitral valve: S/p mitral valve clip with mean pressure gradient of 5.5 mmHg. There was mild annular calcification. There was mild regurgitation.   Aortic valve: Normal valve structure. The valve was trileaflet. Leaflets exhibited mildly increased thickness, mild calcification, and sclerosis. Tricuspid valve: There was mild regurgitation. Pulmonary artery systolic pressure: 59 mmHg. There was moderate pulmonary hypertension. LEFT VENTRICLE: Size was normal. Systolic function was normal. Ejection  fraction was estimated in the range of 55 % to 60 %. There were no regional wall motion abnormalities.  Wall thickness was normal. DOPPLER: Features were consistent with a pseudonormal left ventricular filling pattern, with concomitant abnormal relaxation and increased filling pressure (grade 2 diastolic dysfunction). RIGHT VENTRICLE: The size was normal. Systolic function was normal. LEFT ATRIUM: The atrium was severely dilated. ATRIAL SEPTUM: The atrial septum appeared intact. RIGHT ATRIUM: Size was normal. MITRAL VALVE: S/p mitral valve clip with mean pressure gradient of 5.5 mmHg. There was mild annular calcification. Normal valve structure. DOPPLER: The transmitral velocity was within the normal range. There was mild regurgitation. AORTIC VALVE: Normal valve structure. The valve was trileaflet. Leaflets exhibited mildly increased thickness, mild calcification, and sclerosis. DOPPLER: Transaortic velocity was within the normal range. There was no stenosis. There was no regurgitation. TRICUSPID VALVE: Normal valve structure. DOPPLER: There was mild regurgitation. Tricuspid regurgitation peak velocity: 3.7 m/sec. Right ventricular-right atrial (RV-RA) gradient: 56 mmHg. Right atrial pressure estimate: 0-5 mmHg. Pulmonary artery systolic pressure: 59 mmHg. There was moderate pulmonary hypertension. PULMONIC VALVE: Not well visualized, but normal Doppler findings. AORTA: The root exhibited normal size. PERICARDIUM: There was no pericardial effusion.    MV maxP.7 mmHg  MV meanP.5 mmHg  MVA By PHT: 1.6 cm2    Physical Exam:  General: Well nourished well groomed elderly woman appearing older than stated age accompanied by her sister  Neuro: A&OX3. GRAYSON. PERRL. Pt in wheelchair today. Has cane with her. Typically uses rollator  Head:Normocephalic. Atraumatic. Symmetrical  Neck: Trachea Midline  Resp: CTA B. No Adv BS/cough/sputum/tachypnea with seated conversation  CV: S1S2 RRR. MONTSE II/VI. No JVD/carotid bruits. Pink/warm/dry extremities. Mod LE peripheral edema  GI:Benign ab. Soft. NT/ND.  Active BS  : Voids  Integ: No obvious s/s of infection or breakdown  Musculo/Skeletal: FROM in all major joints. Modest muscle tone     Physical Exam:  General: Well nourished well groomed elderly woman appearing stated age accompanied by her daughter  Neuro: A&OX3. GRAYSON. PERRL. Steady assisted gait with rollator  Head:Normocephalic. Atraumatic. Symmetrical  Neck: Trachea Midline  Resp: CTA B. No Adv BS/cough/sputum/tachypnea with seated conversation  CV: S1S2 RRR. MONTSE II/VI. No JVD/carotid bruits. Pink/warm/dry extremities. Trace LE peripheral edema  GI:Benign ab. Soft. NT/ND. Active BS  : Voids  Integ: No obvious s/s of infection or breakdown  Musculo/Skeletal: FROM in all major joints. Modest muscle tone    Clinic Evaluation:   [facility]Q-12: scanned into EMR    6 minute walk test: PreTest HR/02 sat:  See VS this visit - completed 3 min and stopped d/t back pain             Post Test HR/02 sat: 94 / 97% sat on RA             Distance Walked: 299 ft 3 inches    Assessment/Plan:   1. MR s/p TMVr (2 XT MitraClips at A2 P2 severe to mild/mod severity) on 12/6/2018 - Appears to be doing well from cardiac standpoint. Real exertion/stress avoided d/t back pain. Currently on apixaban for anticoagulation d/t Afib. Only needs ASA 81mg daily for valve patency. If to ever stop anticoagulation, would need daily ASA. Mild MS stable from 30 day eval. Currently with low HR on BB. Has f/u with primary cards in April. No need to return to [facility] at this time. Further care per cards  2. Afib (3/2016) - CCB/BB/apixaban per cards  3. CAD s/p STEMI & MARTHA to Diag (9/2018) - clopidogrel/statin/BB  4.  HTN - A bit elevated today. Currently with BB/CCB/ACEi/nitrate per cards. Will defer titration to primary cards  5. Asthma - Symbicort/Albuterol MDI and albuterol nebs per PCP  6. Hypothyroid - synthroid per PCP  7. Obesity- dietary counseling given. 8. OA - ?  Keppra for neuropathic pain?  Uses rollator typically  9. Urinary incontinence, recurrent UTIs - oxybutynin per PCP  10. Depression - no current Rx    Thank you for the opportunity to participate in the care of this delightful woman.

## 2020-01-15 LAB
AV VELOCITY RATIO: 0.51
AV VTI RATIO: 0.5
ECHO AO ROOT DIAM: 3.29 CM
ECHO AV AREA PEAK VELOCITY: 1.6 CM2
ECHO AV AREA VTI: 1.6 CM2
ECHO AV CUSP MM: 0.94 CM
ECHO AV MEAN GRADIENT: 8.8 MMHG
ECHO AV MEAN VELOCITY: 1.4 M/S
ECHO AV PEAK GRADIENT: 16.9 MMHG
ECHO AV PEAK VELOCITY: 205.76 CM/S
ECHO AV VTI: 52.55 CM
ECHO LA AREA 4C: 25.3 CM2
ECHO LA MAJOR AXIS: 4.1 CM
ECHO LA TO AORTIC ROOT RATIO: 1.25
ECHO LA VOL 2C: 107.67 ML (ref 22–52)
ECHO LA VOL 4C: 85.89 ML (ref 22–52)
ECHO LA VOL BP: 107.04 ML (ref 22–52)
ECHO LA VOL/BSA BIPLANE: 53.05 ML/M2 (ref 16–28)
ECHO LA VOLUME INDEX A2C: 53.36 ML/M2 (ref 16–28)
ECHO LA VOLUME INDEX A4C: 42.57 ML/M2 (ref 16–28)
ECHO LV E' LATERAL VELOCITY: 3.85 CM/S
ECHO LV E' SEPTAL VELOCITY: 3.53 CM/S
ECHO LV INTERNAL DIMENSION DIASTOLIC: 5.25 CM (ref 3.9–5.3)
ECHO LV INTERNAL DIMENSION SYSTOLIC: 3.48 CM
ECHO LV IVSD: 1.26 CM (ref 0.6–0.9)
ECHO LV MASS 2D: 299 G (ref 67–162)
ECHO LV MASS INDEX 2D: 148.2 G/M2 (ref 43–95)
ECHO LV POSTERIOR WALL DIASTOLIC: 1.12 CM (ref 0.6–0.9)
ECHO LVOT DIAM: 1.99 CM
ECHO LVOT PEAK GRADIENT: 4.4 MMHG
ECHO LVOT PEAK VELOCITY: 104.58 CM/S
ECHO LVOT SV: 83 ML
ECHO LVOT VTI: 26.73 CM
ECHO MV A VELOCITY: 94.48 CM/S
ECHO MV AREA PHT: 1.5 CM2
ECHO MV AREA VTI: 1.3 CM2
ECHO MV E DECELERATION TIME (DT): 497.1 MS
ECHO MV E VELOCITY: 178.21 CM/S
ECHO MV E/A RATIO: 1.89
ECHO MV E/E' LATERAL: 46.29
ECHO MV E/E' RATIO (AVERAGED): 48.39
ECHO MV E/E' SEPTAL: 50.48
ECHO MV MAX VELOCITY: 201.14 CM/S
ECHO MV MEAN GRADIENT: 5.1 MMHG
ECHO MV MEAN INFLOW VELOCITY: 1.01 M/S
ECHO MV PEAK GRADIENT: 16.2 MMHG
ECHO MV PRESSURE HALF TIME (PHT): 144.1 MS
ECHO MV VTI: 65.6 CM
ECHO PV MAX VELOCITY: 155.1 CM/S
ECHO PV PEAK GRADIENT: 9.6 MMHG
ECHO RV TAPSE: 2.15 CM (ref 1.5–2)
ECHO TV REGURGITANT MAX VELOCITY: 537.25 CM/S
ECHO TV REGURGITANT PEAK GRADIENT: 115.5 MMHG
LVFS 2D: 33.76 %
LVOT MG: 2.27 MMHG
LVOT MV: 0.7 CM/S
MV DEC SLOPE: 3.59

## 2022-03-18 PROBLEM — E03.9 ACQUIRED HYPOTHYROIDISM: Status: ACTIVE | Noted: 2018-11-16

## 2022-03-18 PROBLEM — I25.10 CORONARY ARTERY DISEASE INVOLVING NATIVE CORONARY ARTERY OF NATIVE HEART: Status: ACTIVE | Noted: 2018-11-16

## 2022-03-19 PROBLEM — I34.0 NON-RHEUMATIC MITRAL REGURGITATION: Status: ACTIVE | Noted: 2018-11-16

## 2022-03-19 PROBLEM — I34.0 MITRAL REGURGITATION: Status: ACTIVE | Noted: 2018-12-05

## 2022-03-19 PROBLEM — I48.0 PAROXYSMAL ATRIAL FIBRILLATION (HCC): Status: ACTIVE | Noted: 2018-11-16

## 2022-03-19 PROBLEM — I10 ESSENTIAL HYPERTENSION: Status: ACTIVE | Noted: 2018-11-16

## 2022-03-20 PROBLEM — E66.01 SEVERE OBESITY (HCC): Status: ACTIVE | Noted: 2018-11-16

## 2023-04-06 NOTE — ANESTHESIA POSTPROCEDURE EVALUATION
Post-Anesthesia Evaluation and Assessment Patient: Sanjana Fontaine MRN: [id number]  SSN: [id number] YOB: 1945  Age: 74 y.o. Sex: female I have evaluated the patient and they are stable and ready for discharge from the PACU. Cardiovascular Function/Vital Signs Visit Vitals /65 (BP Patient Position: Reverse Trendelenburg) Pulse 62 Temp 36.2 °C (97.1 °F) Resp 18 Ht 5' 1\" (1.549 m) Wt 90.9 kg (200 lb 6 oz) SpO2 100% BMI 37.86 kg/m² Patient is status post General anesthesia for Procedure(s): 
TRANSCATHETER MITRAL VALVE REPAIR, MITRAL CLIP. Nausea/Vomiting: None Postoperative hydration reviewed and adequate. Pain: 
Pain Scale 1: Adult Nonverbal Pain Scale (12/05/18 1330) Pain Intensity 1: 0 (12/05/18 0825) Managed Neurological Status:  
Neuro (WDL): Within Defined Limits (12/05/18 0067) At baseline Mental Status, Level of Consciousness: Arousable Pulmonary Status:  
O2 Device: Nasal cannula (12/05/18 1318) Adequate oxygenation and airway patent Complications related to anesthesia: None Post-anesthesia assessment completed. No concerns Signed By: Ines Bumpers, MD   
 December 5, 2018 Procedure(s): 
TRANSCATHETER MITRAL VALVE REPAIR, MITRAL CLIP. 
 
<BSHSIANPOST> Visit Vitals /65 (BP Patient Position: Reverse Trendelenburg) Pulse 62 Temp 36.2 °C (97.1 °F) Resp 18 Ht 5' 1\" (1.549 m) Wt 90.9 kg (200 lb 6 oz) SpO2 100% BMI 37.86 kg/m²
Home

## 2023-05-20 RX ORDER — ONDANSETRON 4 MG/1
4 TABLET, ORALLY DISINTEGRATING ORAL EVERY 8 HOURS PRN
COMMUNITY

## 2023-05-20 RX ORDER — LEVOTHYROXINE SODIUM 0.15 MG/1
TABLET ORAL
COMMUNITY

## 2023-05-20 RX ORDER — BUDESONIDE AND FORMOTEROL FUMARATE DIHYDRATE 160; 4.5 UG/1; UG/1
2 AEROSOL RESPIRATORY (INHALATION) 2 TIMES DAILY
COMMUNITY

## 2023-05-20 RX ORDER — GUAIFENESIN AND CODEINE PHOSPHATE 100; 10 MG/5ML; MG/5ML
5 SOLUTION ORAL 3 TIMES DAILY PRN
COMMUNITY

## 2023-05-20 RX ORDER — LISINOPRIL 40 MG/1
40 TABLET ORAL DAILY
COMMUNITY

## 2023-05-20 RX ORDER — OXYBUTYNIN CHLORIDE 5 MG/1
5 TABLET ORAL 2 TIMES DAILY
COMMUNITY

## 2023-05-20 RX ORDER — ROSUVASTATIN CALCIUM 40 MG/1
40 TABLET, COATED ORAL NIGHTLY
COMMUNITY

## 2023-05-20 RX ORDER — AMLODIPINE BESYLATE 5 MG/1
5 TABLET ORAL DAILY
COMMUNITY

## 2023-05-20 RX ORDER — ATORVASTATIN CALCIUM 40 MG/1
40 TABLET, FILM COATED ORAL DAILY
COMMUNITY

## 2023-05-20 RX ORDER — ISOSORBIDE MONONITRATE 30 MG/1
30 TABLET, EXTENDED RELEASE ORAL DAILY
COMMUNITY

## 2023-05-20 RX ORDER — METOPROLOL SUCCINATE 25 MG/1
25 TABLET, EXTENDED RELEASE ORAL DAILY
COMMUNITY

## 2023-05-20 RX ORDER — CHLORTHALIDONE 25 MG/1
TABLET ORAL DAILY
COMMUNITY

## 2023-05-20 RX ORDER — ACETAMINOPHEN 325 MG/1
650 TABLET ORAL EVERY 4 HOURS PRN
COMMUNITY
Start: 2018-12-07

## 2023-05-20 RX ORDER — ALBUTEROL SULFATE 90 UG/1
2 AEROSOL, METERED RESPIRATORY (INHALATION) EVERY 4 HOURS PRN
COMMUNITY

## 2023-05-20 RX ORDER — CHOLECALCIFEROL (VITAMIN D3) 125 MCG
10 CAPSULE ORAL NIGHTLY
COMMUNITY

## 2023-05-20 RX ORDER — LEVETIRACETAM 500 MG/1
500 TABLET ORAL 2 TIMES DAILY
COMMUNITY

## (undated) DEVICE — MICROPUNCTURE INTRODUCER SET SILHOUETTE TRANSITIONLESS WITH STAINLESS STEEL WIRE GUIDE: Brand: MICROPUNCTURE

## (undated) DEVICE — ELECTRODE ES AD DISPER HYDRGEL THN FOAM ADH SCALLOPED EDGE

## (undated) DEVICE — SOLUTION SET, MALE LUER LOCK ADAPTER

## (undated) DEVICE — TORFLEX TRANSSEPTAL SHEATH; TRANSSEPTAL DILATOR; J-TIP GUIDEWIRE: Brand: TORFLEX TRANSSEPTAL GUIDING SHEATH

## (undated) DEVICE — Device

## (undated) DEVICE — PACK PROCEDURE SURG HRT CATH

## (undated) DEVICE — SYNTHETIC CONTROLCATH TD CATHETER: Brand: SWAN-GANZ CONTROLCATH

## (undated) DEVICE — BAG PRESSURE INFUSION 1000ML -- CONVERT TO ITEM 360122

## (undated) DEVICE — LUB SURG MEDC STRL 2OZ TUBE MC -- MEDICHOICE

## (undated) DEVICE — Device: Brand: BMC CONNECTOR CABLE

## (undated) DEVICE — INFECTION CONTROL KIT SYS

## (undated) DEVICE — STERILE POLYISOPRENE POWDER-FREE SURGICAL GLOVES WITH EMOLLIENT COATING: Brand: PROTEXIS

## (undated) DEVICE — 3M™ TEGADERM™ TRANSPARENT FILM DRESSING FRAME STYLE, 1626W, 4 IN X 4-3/4 IN (10 CM X 12 CM), 50/CT 4CT/CASE: Brand: 3M™ TEGADERM™

## (undated) DEVICE — GDWIRE ANGIO SUP STF 0.035IN --

## (undated) DEVICE — Device: Brand: NRG TRANSSEPTAL NEEDLE

## (undated) DEVICE — STERILE POLYISOPRENE POWDER-FREE SURGICAL GLOVES: Brand: PROTEXIS

## (undated) DEVICE — (D)PREP SKN CHLRAPRP APPL 26ML -- CONVERT TO ITEM 371833

## (undated) DEVICE — DRAPE,REIN 53X77,STERILE: Brand: MEDLINE

## (undated) DEVICE — GOWN,SIRUS,FABRNF,XL,20/CS: Brand: MEDLINE

## (undated) DEVICE — INTENDED TO STANDARDIZE OR CAMERAS TO ALLOW FOR THE USE OF THE OR CAMERA COVER: Brand: ASPEN® O.R. CAMERA COVER

## (undated) DEVICE — SYR 50ML LR LCK 1ML GRAD NSAF --

## (undated) DEVICE — PRESSURE MONITORING SET: Brand: TRUWAVE

## (undated) DEVICE — COVER,TABLE,60X90,STERILE: Brand: MEDLINE

## (undated) DEVICE — SUT SLK 0 30IN CT1 BLK --

## (undated) DEVICE — SENSOR TEMP SKIN DISP

## (undated) DEVICE — CHECK-FLO PERFORMER INTRODUCER: Brand: CHECK-FLO

## (undated) DEVICE — DRAPE PRB US TRNSDCR 6X96IN --

## (undated) DEVICE — PRESSURE MONITORING ACCESSORY: Brand: TRUWAVE

## (undated) DEVICE — LUER-LOK 360°: Brand: CONNECTA, LUER-LOK

## (undated) DEVICE — PINNACLE INTRODUCER SHEATH: Brand: PINNACLE

## (undated) DEVICE — 40418 TRENDELENBURG ONE-STEP ARM PROTECTORS LARGE (1 PAIR): Brand: 40418 TRENDELENBURG ONE-STEP ARM PROTECTORS LARGE (1 PAIR)

## (undated) DEVICE — WRAP SURG W1.31XL1.34M CARD FOR PT 165-172CM THERMOWRP